# Patient Record
Sex: FEMALE | Race: WHITE | Employment: UNEMPLOYED
[De-identification: names, ages, dates, MRNs, and addresses within clinical notes are randomized per-mention and may not be internally consistent; named-entity substitution may affect disease eponyms.]

---

## 2017-07-08 ENCOUNTER — HEALTH MAINTENANCE LETTER (OUTPATIENT)
Age: 52
End: 2017-07-08

## 2018-04-14 ENCOUNTER — HOSPITAL ENCOUNTER (EMERGENCY)
Facility: CLINIC | Age: 53
Discharge: HOME | End: 2018-04-14
Attending: NURSE PRACTITIONER | Admitting: NURSE PRACTITIONER
Payer: COMMERCIAL

## 2018-04-14 VITALS
RESPIRATION RATE: 16 BRPM | OXYGEN SATURATION: 96 % | BODY MASS INDEX: 26.63 KG/M2 | HEART RATE: 91 BPM | SYSTOLIC BLOOD PRESSURE: 125 MMHG | WEIGHT: 160 LBS | TEMPERATURE: 97.6 F | DIASTOLIC BLOOD PRESSURE: 77 MMHG

## 2018-04-14 DIAGNOSIS — B34.9 VIRAL SYNDROME: ICD-10-CM

## 2018-04-14 PROCEDURE — 99203 OFFICE O/P NEW LOW 30 MIN: CPT | Mod: Z6 | Performed by: NURSE PRACTITIONER

## 2018-04-14 PROCEDURE — G0463 HOSPITAL OUTPT CLINIC VISIT: HCPCS | Performed by: NURSE PRACTITIONER

## 2018-04-14 ASSESSMENT — ENCOUNTER SYMPTOMS
EYES NEGATIVE: 1
SORE THROAT: 0
MUSCULOSKELETAL NEGATIVE: 1
CHILLS: 1
ALLERGIC/IMMUNOLOGIC NEGATIVE: 1
SHORTNESS OF BREATH: 0
ACTIVITY CHANGE: 0
HEMATOLOGIC/LYMPHATIC NEGATIVE: 1
SINUS PRESSURE: 0
ENDOCRINE NEGATIVE: 1
CARDIOVASCULAR NEGATIVE: 1
PSYCHIATRIC NEGATIVE: 1
NEUROLOGICAL NEGATIVE: 1
GASTROINTESTINAL NEGATIVE: 1
FEVER: 1
COUGH: 0
WHEEZING: 0

## 2018-04-14 NOTE — ED PROVIDER NOTES
History     Chief Complaint   Patient presents with     Fever     2 days of fever, off and on no other sx     The history is provided by the patient.     Christine Verdugo is a 53 year old female who presents to the  with fevers off and on since Wednesday at night and cold during the day and no other symptoms. Christine states she has treated her fevers with Advil.  Christine states she has had sinus infections in the past and this does not feel like it.     Problem List:    Patient Active Problem List    Diagnosis Date Noted     Major depression in complete remission (H) 07/25/2012     Priority: Medium     CARDIOVASCULAR SCREENING; LDL GOAL LESS THAN 160 10/31/2010     Priority: Medium     Anxiety 07/13/2009     Priority: Medium        Past Medical History:    No past medical history on file.    Past Surgical History:    No past surgical history on file.    Family History:    Family History   Problem Relation Age of Onset     DIABETES Father      Alcohol/Drug Father      Breast Cancer Maternal Grandmother      Breast Cancer Sister      Breast Cancer Maternal Aunt        Social History:  Marital Status:   [2]  Social History   Substance Use Topics     Smoking status: Former Smoker     Packs/day: 0.30     Types: Cigarettes     Quit date: 3/1/2009     Smokeless tobacco: Never Used     Alcohol use Yes        Medications:      fluticasone (FLONASE) 50 MCG/ACT nasal spray   NO ACTIVE MEDICATIONS         Review of Systems   Constitutional: Positive for chills and fever. Negative for activity change.   HENT: Negative for congestion, ear pain, sinus pressure and sore throat.    Eyes: Negative.    Respiratory: Negative for cough, shortness of breath and wheezing.    Cardiovascular: Negative.    Gastrointestinal: Negative.    Endocrine: Negative.    Genitourinary: Negative.    Musculoskeletal: Negative.    Skin: Negative.    Allergic/Immunologic: Negative.    Neurological: Negative.    Hematological: Negative.     Psychiatric/Behavioral: Negative.        Physical Exam   BP: 137/73  Pulse: 91  Temp: 97.6  F (36.4  C)  Resp: 16  Weight: 72.6 kg (160 lb)  SpO2: 96 %      Physical Exam   Constitutional: She is oriented to person, place, and time.   HENT:   Right Ear: Hearing, tympanic membrane, external ear and ear canal normal.   Left Ear: Hearing, tympanic membrane, external ear and ear canal normal.   Nose: Right sinus exhibits no maxillary sinus tenderness and no frontal sinus tenderness. Left sinus exhibits no maxillary sinus tenderness and no frontal sinus tenderness.   Mouth/Throat: Uvula is midline, oropharynx is clear and moist and mucous membranes are normal. No oral lesions. Dental caries present. No dental abscesses.   Cardiovascular: Normal rate and normal heart sounds.    Pulmonary/Chest: Effort normal and breath sounds normal.   Neurological: She is alert and oriented to person, place, and time.   Skin: Skin is warm and dry. No rash noted.   Psychiatric: She has a normal mood and affect.   Nursing note and vitals reviewed.      ED Course     ED Course     Procedures               Critical Care time:  none               No results found for this or any previous visit (from the past 24 hour(s)).    Medications - No data to display    Assessments & Plan (with Medical Decision Making)     I have reviewed the nursing notes.    I have reviewed the findings, diagnosis, plan and need for follow up with the patient.   Discussed physical exam. Offered to check for influenza or check a CBC. Christine declined at this time. Encouraged symptomatic treatment for now. If fevers are uncontrolled, develops SOB, or any new symptoms she should return to the UC/ER. If fevers continue next week she should follow up with her PCP.  Christine agrees with plan.     New Prescriptions    No medications on file       Final diagnoses:   Viral syndrome       4/14/2018   [facility] EMERGENCY DEPARTMENT     Meggan Jacobo, ROSARIO  CNP  04/14/18 133

## 2018-04-14 NOTE — ED AVS SNAPSHOT
[facility] Emergency Department    [address] [address]    Phone:  [phone]    Fax:  [phone]                                       Christine Verdugo   MRN: [id number]    Department:  [facility] Emergency Department   Date of Visit:  4/14/2018           After Visit Summary Signature Page     I have received my discharge instructions, and my questions have been answered. I have discussed any challenges I see with this plan with the nurse or doctor.    ..........................................................................................................................................  Patient/Patient Representative Signature      ..........................................................................................................................................  Patient Representative Print Name and Relationship to Patient    ..................................................               ................................................  Date                                            Time    ..........................................................................................................................................  Reviewed by Signature/Title    ...................................................              ..............................................  Date                                                            Time

## 2018-04-14 NOTE — ED AVS SNAPSHOT
" [facility] [place] Emergency Department    [address] [address]    Phone:  [phone]    Fax:  [phone]                                       Christine Verdugo   MRN: [id number]    Department:  [facility] [place] Emergency Department   Date of Visit:  4/14/2018           Patient Information     Date Of Birth          1965        Your diagnoses for this visit were:     Viral syndrome        You were seen by Meggan Jacobo APRN CNP.      Follow-up Information     Follow up with JAYLENE Cline MD.    Specialty:  Family Practice    Why:  If symptoms worsen, As needed    Contact information:    [address] [address] [facility] [address]  [phone]          Follow up with [facility] [place] Emergency Department.    Specialty:  EMERGENCY MEDICINE    Why:  If symptoms worsen    Contact information:    [address] [place] [id number] [phone]    Additional information:    The medical center is located at   [address] [place] [address] (between [facility] and   [address] in [place], four miles north   of [place]).        Discharge Instructions         Viral Syndrome (Adult)  A viral illness may cause a number of symptoms. The symptoms depend on the part of the body that the virus affects. If it settles in your nose, throat, and lungs, it may cause cough, sore throat, congestion, and sometimes headache. If it settles in your stomach and intestinal tract, it may cause vomiting and diarrhea. Sometimes it causes vague symptoms like \"aching all over,\" feeling tired, loss of appetite, or fever.  A viral illness usually lasts 1 to 2 weeks, but sometimes it lasts longer. In some cases, a more serious infection can look like a viral syndrome in the first few days of the illness. You may need another exam and additional tests to know the difference. Watch for the warning signs listed below.  Home care  Follow these guidelines for taking care of yourself at home:    If symptoms are " severe, rest at home for the first 2 to 3 days.    Stay away from cigarette smoke - both your smoke and the smoke from others.    You may use over-the-counter acetaminophen or ibuprofen for fever, muscle aching, and headache, unless another medicine was prescribed for this. If you have chronic liver or kidney disease or ever had a stomach ulcer or GI bleeding, talk with your doctor before using these medicines. No one who is younger than 18 and ill with a fever should take aspirin. It may cause severe disease or death.    Your appetite may be poor, so a light diet is fine. Avoid dehydration by drinking 8 to 12 8-ounce glasses of fluids each day. This may include water; orange juice; lemonade; apple, grape, and cranberry juice; clear fruit drinks; electrolyte replacement and sports drinks; and decaffeinated teas and coffee. If you have been diagnosed with a kidney disease, ask your doctor how much and what types of fluids you should drink to prevent dehydration. If you have kidney disease, drinking too much fluid can cause it build up in the your body and be dangerous to your health.    Over-the-counter remedies won't shorten the length of the illness but may be helpful for cough, sore throat; and nasal and sinus congestion. Don't use decongestants if you have high blood pressure.  Follow-up care  Follow up with your healthcare provider if you do not improve over the next week.  Call 911  Get emergency medical care if any of the following occur:    Convulsion    Feeling weak, dizzy, or like you are going to faint    Chest pain, shortness of breath, wheezing, or difficulty breathing  When to seek medical advice  Call your healthcare provider right away if any of these occur:    Cough with lots of colored sputum (mucus) or blood in your sputum    Chest pain, shortness of breath, wheezing, or difficulty breathing    Severe headache; face, neck, or ear pain    Severe, constant pain in the lower right side of your belly  (abdominal)    Continued vomiting (can t keep liquids down)    Frequent diarrhea (more than 5 times a day); blood (red or black color) or mucus in diarrhea    Feeling weak, dizzy, or like you are going to faint    Extreme thirst    Fever of 100.4 F (38 C) or higher, or as directed by your healthcare provider  Date Last Reviewed: 9/25/2015 2000-2017 [employer]. [address] [place] [address], [place] [address]. All rights reserved. This information is not intended as a substitute for professional medical care. Always follow your healthcare professional's instructions.          24 Hour Appointment Hotline       To make an appointment at any [facility], call [phone] ([phone]). If you don't have a family doctor or clinic, we will help you find one. [facility] clinics are conveniently located to serve the needs of you and your family.             Review of your medicines      Our records show that you are taking the medicines listed below. If these are incorrect, please call your family doctor or clinic.        Dose / Directions Last dose taken    fluticasone 50 MCG/ACT spray   Commonly known as:  FLONASE   Dose:  1-2 spray   Quantity:  1 Package        Spray 1-2 sprays into both nostrils daily.   Refills:  11        NO ACTIVE MEDICATIONS        .   Refills:  0                Orders Needing Specimen Collection     None      Pending Results     No orders found from 4/12/2018 to 4/15/2018.            Pending Culture Results     No orders found from 4/12/2018 to 4/15/2018.            Pending Results Instructions     If you had any lab results that were not finalized at the time of your Discharge, you can call the ED Lab Result RN at [phone]. You will be contacted by this team for any positive Lab results or changes in treatment. The nurses are available 7 days a week from 10A to 6:30P.  You can leave a message 24 hours per day and they will return your call.        Test Results From Your  "Hospital Stay               Thank you for choosing [facility]       Thank you for choosing [facility] for your care. Our goal is always to provide you with excellent care. Hearing back from our patients is one way we can continue to improve our services. Please take a few minutes to complete the written survey that you may receive in the mail after you visit with us. Thank you!        [employer] Information     [employer] lets you send messages to your doctor, view your test results, renew your prescriptions, schedule appointments and more. To sign up, go to [email or web address] . Click on \"Log in\" on the left side of the screen, which will take you to the Welcome page. Then click on \"Sign up Now\" on the right side of the page.     You will be asked to enter the access code listed below, as well as some personal information. Please follow the directions to create your username and password.     Your access code is: [email or web address]  Expires: 2018  1:32 PM     Your access code will  in 90 days. If you need help or a new code, please call your [facility] clinic or [phone].        Care EveryWhere ID     This is your Care EveryWhere ID. This could be used by other organizations to access your [facility] medical records  [id number]        Equal Access to Services     CYNDY ROGERS : Hadcarmella putnamo Sojey, waaxda luqadaha, qaybta kaalmada adeegyada, evelio rider. So [facility] [place] [phone].    ATENCIÓN: Si habla español, tiene a ryan disposición servicios gratuitos de asistencia lingüística. Llame al [phone].    We comply with applicable federal civil rights laws and [place] laws. We do not discriminate on the basis of race, color, national origin, age, disability, sex, sexual orientation, or gender identity.            After Visit Summary       This is your record. Keep this with you and show to your community pharmacist(s) and doctor(s) at your next visit.                  "

## 2018-04-14 NOTE — DISCHARGE INSTRUCTIONS

## 2021-05-04 ENCOUNTER — HOSPITAL ENCOUNTER (EMERGENCY)
Facility: CLINIC | Age: 56
Discharge: LEFT BEFORE DISCHARGE | End: 2021-05-04

## 2021-05-04 VITALS
OXYGEN SATURATION: 100 % | HEART RATE: 105 BPM | DIASTOLIC BLOOD PRESSURE: 79 MMHG | TEMPERATURE: 97.8 F | SYSTOLIC BLOOD PRESSURE: 141 MMHG | RESPIRATION RATE: 18 BRPM

## 2021-05-04 NOTE — ED NOTES
Pt requesting UC at this time.   Advised pt that possibility of moving to ER to be seen depending on provider.    UC provider feels like pt should be emergency care.   Pt in tears due to not having insurance at this time.

## 2024-10-17 SDOH — HEALTH STABILITY: PHYSICAL HEALTH: ON AVERAGE, HOW MANY MINUTES DO YOU ENGAGE IN EXERCISE AT THIS LEVEL?: 30 MIN

## 2024-10-17 SDOH — HEALTH STABILITY: PHYSICAL HEALTH: ON AVERAGE, HOW MANY DAYS PER WEEK DO YOU ENGAGE IN MODERATE TO STRENUOUS EXERCISE (LIKE A BRISK WALK)?: 7 DAYS

## 2024-10-17 ASSESSMENT — SOCIAL DETERMINANTS OF HEALTH (SDOH): HOW OFTEN DO YOU GET TOGETHER WITH FRIENDS OR RELATIVES?: NEVER

## 2024-10-18 ENCOUNTER — OFFICE VISIT (OUTPATIENT)
Dept: FAMILY MEDICINE | Facility: CLINIC | Age: 59
End: 2024-10-18
Payer: COMMERCIAL

## 2024-10-18 ENCOUNTER — PATIENT OUTREACH (OUTPATIENT)
Dept: ONCOLOGY | Facility: CLINIC | Age: 59
End: 2024-10-18

## 2024-10-18 ENCOUNTER — ORDERS ONLY (AUTO-RELEASED) (OUTPATIENT)
Dept: FAMILY MEDICINE | Facility: CLINIC | Age: 59
End: 2024-10-18

## 2024-10-18 VITALS
SYSTOLIC BLOOD PRESSURE: 130 MMHG | HEIGHT: 65 IN | OXYGEN SATURATION: 99 % | RESPIRATION RATE: 18 BRPM | HEART RATE: 87 BPM | WEIGHT: 113 LBS | DIASTOLIC BLOOD PRESSURE: 74 MMHG | BODY MASS INDEX: 18.83 KG/M2 | TEMPERATURE: 98.2 F

## 2024-10-18 DIAGNOSIS — Z12.11 SCREEN FOR COLON CANCER: ICD-10-CM

## 2024-10-18 DIAGNOSIS — Z63.6 CAREGIVER STRESS: ICD-10-CM

## 2024-10-18 DIAGNOSIS — R63.4 WEIGHT LOSS: ICD-10-CM

## 2024-10-18 DIAGNOSIS — H90.5 SENSORINEURAL HEARING LOSS (SNHL) OF LEFT EAR, UNSPECIFIED HEARING STATUS ON CONTRALATERAL SIDE: ICD-10-CM

## 2024-10-18 DIAGNOSIS — F41.9 ANXIETY: ICD-10-CM

## 2024-10-18 DIAGNOSIS — Z00.00 ROUTINE GENERAL MEDICAL EXAMINATION AT A HEALTH CARE FACILITY: Primary | ICD-10-CM

## 2024-10-18 DIAGNOSIS — Z13.220 LIPID SCREENING: ICD-10-CM

## 2024-10-18 DIAGNOSIS — Z11.4 SCREENING FOR HIV (HUMAN IMMUNODEFICIENCY VIRUS): ICD-10-CM

## 2024-10-18 DIAGNOSIS — Z80.3 FAMILY HISTORY OF MALIGNANT NEOPLASM OF BREAST: ICD-10-CM

## 2024-10-18 DIAGNOSIS — Z12.31 VISIT FOR SCREENING MAMMOGRAM: ICD-10-CM

## 2024-10-18 DIAGNOSIS — Z11.59 NEED FOR HEPATITIS C SCREENING TEST: ICD-10-CM

## 2024-10-18 LAB
ALBUMIN SERPL BCG-MCNC: 4.4 G/DL (ref 3.5–5.2)
ALP SERPL-CCNC: 76 U/L (ref 40–150)
ALT SERPL W P-5'-P-CCNC: 16 U/L (ref 0–50)
ANION GAP SERPL CALCULATED.3IONS-SCNC: 7 MMOL/L (ref 7–15)
AST SERPL W P-5'-P-CCNC: 19 U/L (ref 0–45)
BASOPHILS # BLD AUTO: 0 10E3/UL (ref 0–0.2)
BASOPHILS NFR BLD AUTO: 0 %
BILIRUB SERPL-MCNC: 1.1 MG/DL
BUN SERPL-MCNC: 9.5 MG/DL (ref 8–23)
CALCIUM SERPL-MCNC: 9.1 MG/DL (ref 8.8–10.4)
CHLORIDE SERPL-SCNC: 108 MMOL/L (ref 98–107)
CHOLEST SERPL-MCNC: 176 MG/DL
CREAT SERPL-MCNC: 0.74 MG/DL (ref 0.51–0.95)
CRP SERPL-MCNC: <3 MG/L
EGFRCR SERPLBLD CKD-EPI 2021: >90 ML/MIN/1.73M2
EOSINOPHIL # BLD AUTO: 0.1 10E3/UL (ref 0–0.7)
EOSINOPHIL NFR BLD AUTO: 2 %
ERYTHROCYTE [DISTWIDTH] IN BLOOD BY AUTOMATED COUNT: 13 % (ref 10–15)
ERYTHROCYTE [SEDIMENTATION RATE] IN BLOOD BY WESTERGREN METHOD: 7 MM/HR (ref 0–30)
EST. AVERAGE GLUCOSE BLD GHB EST-MCNC: 100 MG/DL
FASTING STATUS PATIENT QL REPORTED: NO
FASTING STATUS PATIENT QL REPORTED: NO
GLUCOSE SERPL-MCNC: 85 MG/DL (ref 70–99)
HBA1C MFR BLD: 5.1 % (ref 0–5.6)
HCO3 SERPL-SCNC: 27 MMOL/L (ref 22–29)
HCT VFR BLD AUTO: 39.4 % (ref 35–47)
HCV AB SERPL QL IA: NONREACTIVE
HDLC SERPL-MCNC: 74 MG/DL
HGB BLD-MCNC: 13 G/DL (ref 11.7–15.7)
HIV 1+2 AB+HIV1 P24 AG SERPL QL IA: NONREACTIVE
IMM GRANULOCYTES # BLD: 0 10E3/UL
IMM GRANULOCYTES NFR BLD: 0 %
LDLC SERPL CALC-MCNC: 79 MG/DL
LYMPHOCYTES # BLD AUTO: 1.5 10E3/UL (ref 0.8–5.3)
LYMPHOCYTES NFR BLD AUTO: 31 %
MCH RBC QN AUTO: 31 PG (ref 26.5–33)
MCHC RBC AUTO-ENTMCNC: 33 G/DL (ref 31.5–36.5)
MCV RBC AUTO: 94 FL (ref 78–100)
MONOCYTES # BLD AUTO: 0.4 10E3/UL (ref 0–1.3)
MONOCYTES NFR BLD AUTO: 8 %
NEUTROPHILS # BLD AUTO: 2.8 10E3/UL (ref 1.6–8.3)
NEUTROPHILS NFR BLD AUTO: 60 %
NONHDLC SERPL-MCNC: 102 MG/DL
PLATELET # BLD AUTO: 189 10E3/UL (ref 150–450)
POTASSIUM SERPL-SCNC: 3.9 MMOL/L (ref 3.4–5.3)
PROT SERPL-MCNC: 6.8 G/DL (ref 6.4–8.3)
RBC # BLD AUTO: 4.2 10E6/UL (ref 3.8–5.2)
SODIUM SERPL-SCNC: 142 MMOL/L (ref 135–145)
TRIGL SERPL-MCNC: 115 MG/DL
TSH SERPL DL<=0.005 MIU/L-ACNC: 2.02 UIU/ML (ref 0.3–4.2)
WBC # BLD AUTO: 4.7 10E3/UL (ref 4–11)

## 2024-10-18 PROCEDURE — 80061 LIPID PANEL: CPT | Performed by: FAMILY MEDICINE

## 2024-10-18 PROCEDURE — 86803 HEPATITIS C AB TEST: CPT | Performed by: FAMILY MEDICINE

## 2024-10-18 PROCEDURE — 86140 C-REACTIVE PROTEIN: CPT | Performed by: FAMILY MEDICINE

## 2024-10-18 PROCEDURE — 99204 OFFICE O/P NEW MOD 45 MIN: CPT | Mod: 25 | Performed by: FAMILY MEDICINE

## 2024-10-18 PROCEDURE — 36415 COLL VENOUS BLD VENIPUNCTURE: CPT | Performed by: FAMILY MEDICINE

## 2024-10-18 PROCEDURE — 80053 COMPREHEN METABOLIC PANEL: CPT | Performed by: FAMILY MEDICINE

## 2024-10-18 PROCEDURE — 85025 COMPLETE CBC W/AUTO DIFF WBC: CPT | Performed by: FAMILY MEDICINE

## 2024-10-18 PROCEDURE — 84443 ASSAY THYROID STIM HORMONE: CPT | Performed by: FAMILY MEDICINE

## 2024-10-18 PROCEDURE — 85652 RBC SED RATE AUTOMATED: CPT | Performed by: FAMILY MEDICINE

## 2024-10-18 PROCEDURE — 87389 HIV-1 AG W/HIV-1&-2 AB AG IA: CPT | Performed by: FAMILY MEDICINE

## 2024-10-18 PROCEDURE — 99396 PREV VISIT EST AGE 40-64: CPT | Performed by: FAMILY MEDICINE

## 2024-10-18 PROCEDURE — 83036 HEMOGLOBIN GLYCOSYLATED A1C: CPT | Performed by: FAMILY MEDICINE

## 2024-10-18 RX ORDER — HYDROXYZINE HYDROCHLORIDE 25 MG/1
25 TABLET, FILM COATED ORAL 3 TIMES DAILY PRN
Qty: 30 TABLET | Refills: 1 | Status: SHIPPED | OUTPATIENT
Start: 2024-10-18

## 2024-10-18 RX ORDER — OMEPRAZOLE 20 MG/1
20 TABLET, DELAYED RELEASE ORAL DAILY
COMMUNITY

## 2024-10-18 SDOH — SOCIAL STABILITY - SOCIAL INSECURITY: DEPENDENT RELATIVE NEEDING CARE AT HOME: Z63.6

## 2024-10-18 ASSESSMENT — PAIN SCALES - GENERAL: PAINLEVEL: NO PAIN (0)

## 2024-10-18 NOTE — PROGRESS NOTES
New Patient Oncology Nurse Navigator Note     Referring provider: Inga Dumas DOCl [facility]     Referred to (specialty):  Genetic Counseling    Date Referral Received: 10/18/2024     Evaluation for: 59 year old female, family hx of sister who diagnosed with breast cancer in early 30s and passed

## 2024-10-18 NOTE — PROGRESS NOTES
Preventive Care Visit  [facility]  MATILDA RODAS DO, Family Medicine  Oct 18, 2024      Assessment & Plan     Routine general medical examination at a health care facility    Visit for screening mammogram  - MA Screening Bilateral w/ Smith    Screen for colon cancer  - COLOGUARD(EXACT SCIENCES)    Screening for HIV (human immunodeficiency virus)  - HIV Antigen Antibody Combo  - HIV Antigen Antibody Combo    Need for hepatitis C screening test  - Hepatitis C Screen Reflex to HCV RNA Quant and Genotype  - Hepatitis C Screen Reflex to HCV RNA Quant and Genotype    Lipid screening  - Lipid panel reflex to direct LDL Non-fasting  - Lipid panel reflex to direct LDL Non-fasting    Anxiety  Ongoing anxiety, difficult living situation currently.  Not interested in daily medication but would be interested in trying as needed med for sleep.  Recommending establishing with counselor she is also looking outside of [place]  - Adult Mental [facility] Referral  - hydrOXYzine HCl (ATARAX) 25 MG tablet  Dispense: 30 tablet; Refill: 1    Caregiver stress  Helps to care for her mother-in-law  - Adult Mental Health  Referral    Weight loss  Patient used to be heavy alcohol use, quit about 2 years ago and is lost significant amount of weight.  Sometimes has hard time maintaining weight.  Plan to do labs as below to rule out any metabolic causes.  I would like to see her in close follow-up in 4 to 6 weeks.  I did offer considering doing upper and lower endoscopy however she would defer at this time  - Comprehensive metabolic panel (BMP + Alb, Alk Phos, ALT, AST, Total. Bili, TP)  - Hemoglobin A1c  - CBC with platelets and differential  - TSH with free T4 reflex  - ESR: Erythrocyte sedimentation rate  - CRP, inflammation  - Comprehensive metabolic panel (BMP + Alb, Alk Phos, ALT, AST, Total. Bili, TP)  - Hemoglobin A1c  - CBC with platelets and differential  - TSH with free T4 reflex  - ESR:  Erythrocyte sedimentation rate  - CRP, inflammation    Sensorineural hearing loss (SNHL) of left ear, unspecified hearing status on contralateral side  Left ear with profound hearing loss,), no cause on exam today appreciated.  Recommend meeting with ENT  - Adult ENT  Referral    Family history of malignant neoplasm of breast  Sister was diagnosed with breast cancer in her early 30s and passed away shortly after this.  Has not kept up with mammograms.  Recommend meeting with genetics to discuss any genetic screening  - Adult Oncology/Hematology  Referral        Counseling  Appropriate preventive services were addressed with this patient via screening, questionnaire, or discussion as appropriate for fall prevention, nutrition, physical activity, Tobacco-use cessation, social engagement, weight loss and cognition.  Checklist reviewing preventive services available has been given to the patient.  Reviewed patient's diet, addressing concerns and/or questions.   Patient is at risk for social isolation and has been provided with information about the benefit of social connection.   The patient was instructed to see the dentist every 6 months.   She is at risk for psychosocial distress and has been provided with information to reduce risk.       Zeenat Georges is a 59 year old, presenting for the following:  Physical        10/18/2024     6:54 AM   Additional Questions   Roomed by Ivanna Ledezma CMA   Accompanied by         Health Care Directive  Patient does not have a Health Care Directive or Living Will: Discussed advance care planning with patient; information given to patient to review.    HPI    Unable to hear only on her left side-unsure for how long   6 months ago 129 lbs- 15 lbs down from last 6 months         10/17/2024   General Health   How would you rate your overall physical health? Excellent   Feel stress (tense, anxious, or unable to sleep) Only a little      (!) STRESS  CONCERN      10/17/2024   Nutrition   Three or more servings of calcium each day? (!) I DON'T KNOW   Diet: Regular (no restrictions)   How many servings of fruit and vegetables per day? 4 or more   How many sweetened beverages each day? 0-1            10/17/2024   Exercise   Days per week of moderate/strenous exercise 7 days   Average minutes spent exercising at this level 30 min            10/17/2024   Social Factors   Frequency of gathering with friends or relatives Never   Worry food won't last until get money to buy more No   Food not last or not have enough money for food? No   Do you have housing? (Housing is defined as stable permanent housing and does not include staying ouside in a car, in a tent, in an abandoned building, in an overnight shelter, or couch-surfing.) Yes   Are you worried about losing your housing? No   Lack of transportation? No   Unable to get utilities (heat,electricity)? No      (!) SOCIAL CONNECTIONS CONCERN      10/17/2024   Fall Risk   Fallen 2 or more times in the past year? No   Trouble with walking or balance? No             10/17/2024   Dental   Dentist two times every year? (!) NO            10/17/2024   TB Screening   Were you born outside of the [place]? No            Today's PHQ-2 Score:       10/17/2024     7:23 PM   PHQ-2 ( 1999 [employer])   Q1: Little interest or pleasure in doing things 1   Q2: Feeling down, depressed or hopeless 1   PHQ-2 Score 2   Q1: Little interest or pleasure in doing things Several days   Q2: Feeling down, depressed or hopeless Several days   PHQ-2 Score 2           10/17/2024   Substance Use   Alcohol more than 3/day or more than 7/wk Not Applicable   Do you use any other substances recreationally? (!) OTHER        Social History     Tobacco Use    Smoking status: Former     Current packs/day: 0.00     Types: Cigarettes     Quit date: 3/1/2009     Years since quitting: 15.6    Smokeless tobacco: Never   Substance Use Topics    Alcohol use: Not Currently     "Drug use: No        Mammogram Screening - Mammogram every 1-2 years updated in Health Maintenance based on mutual decision making          10/17/2024   One time HIV Screening   Previous HIV test? I don't know          10/17/2024   STI Screening   New sexual partner(s) since last STI/HIV test? No        History of abnormal Pap smear: Status post hysterectomy with removal of cervix and no history of CIN2 or greater or cervical cancer. Health Maintenance and Surgical History updated.       ASCVD Risk   The 10-year ASCVD risk score (Hong LOMELI, et al., 2019) is: 2.2%    Values used to calculate the score:      Age: 59 years      Sex: Female      Is Non- : No      Diabetic: No      Tobacco smoker: No      Systolic Blood Pressure: 130 mmHg      Is BP treated: No      HDL Cholesterol: 74 mg/dL      Total Cholesterol: 176 mg/dL          Reviewed and updated as needed this visit by Provider   Tobacco      Surg Hx             No past medical history on file.  Past Surgical History:   Procedure Laterality Date    HYSTERECTOMY           Review of Systems  Constitutional, HEENT, cardiovascular, pulmonary, gi and gu systems are negative, except as otherwise noted.     Objective    Exam  /74   Pulse 87   Temp 98.2  F (36.8  C) (Tympanic)   Resp 18   Ht 1.651 m (5' 5\")   Wt 51.3 kg (113 lb)   LMP  (LMP Unknown)   SpO2 99%   BMI 18.80 kg/m     Estimated body mass index is 18.8 kg/m  as calculated from the following:    Height as of this encounter: 1.651 m (5' 5\").    Weight as of this encounter: 51.3 kg (113 lb).    Physical Exam  Constitutional:       Appearance: Normal appearance.   HENT:      Head: Normocephalic.      Right Ear: Tympanic membrane normal.      Left Ear: Tympanic membrane normal.      Nose: Nose normal.   Eyes:      Conjunctiva/sclera: Conjunctivae normal.   Cardiovascular:      Rate and Rhythm: Normal rate and regular rhythm.   Pulmonary:      Effort: Pulmonary " effort is normal.      Breath sounds: Normal breath sounds.   Abdominal:      General: Bowel sounds are normal.   Musculoskeletal:      Right lower leg: No edema.      Left lower leg: No edema.   Skin:     General: Skin is warm and dry.   Neurological:      Mental Status: She is alert.   Psychiatric:         Thought Content: Thought content normal.         Judgment: Judgment normal.         Signed Electronically by: MATILDA RODAS DO

## 2024-10-18 NOTE — PATIENT INSTRUCTIONS
[employer] [place] Website:   [email or web address].[email or web address]       Psychology Today Website - : https://www.psychologytoday.com/us/therapists/      Crisis Resources       [facility] CRISIS LINE: [phone]       If you are in crisis, please take good care of yourself and consider accessing the resources available to you:       Steps to care for yourself       1. If you are currently in therapy, call your therapist for an appointment   2. Call the local crisis resources below if needed.   3. Contact friends or family for support.   4. Get more exercise.   5. Do activities you enjoy.   6. Eat a well-balanced diet and drink plenty of fluids.   7. Rest as needed. Get good sleep.   8. Limit or discontinue alcohol and recreational drugs.       When to contact your primary care provider         You have thoughts of harming or killing yourself but have not made a plan to carry it out.     Your depression gets in the way of daily activities.     You are unable to sleep.     You need help cutting back on alcohol or recreational drugs.       When to call 911 or go to the Emergency Room       Get emergency help right away if you have any of the following:     You are planning to harm or kill yourself and you have a way to carry out the plan.     You have injured yourself or others. Or, you think you will.     You feel confused or are having trouble thinking or remembering.     You are having delusions (false beliefs).     You are hearing voices or seeing things that aren't there.     You are feeling psychotic (paranoid, fearful, restless, agitated, nervous, racing thoughts or speech)       Crisis Resources       The telephone number for the [facility] ([facility]), [facility] is [phone]       These hotlines are for both adults and children. The and are open 24 hours a day, 7 days a week unless noted otherwise.       National Suicide Prevention  Lifeline   5-741-797-TALK (4989)     In case of life-threatening emergency, call 911 or go to the emergency department.     Call the National Suicide Prevention Lifeline 2-168-400-TALK (7454) to be connected to a counselor at a crisis center in your area if you, a family member, or friend are experiencing thoughts of suicide. The call is FREE, confidential, and always available.     Crisis Text Line: Text HOME to 401263 to connect with a Crisis Counselor. Free 24/7 support.       Crisis Text Line                        www.crisistextline.org   Text HOME to 521139 from anywhere in the [place], anytime, about any type of crisis. A live, trained crisis counselor will receive the text and respond quickly.     For [facility] crisis numbers, visit the [facility] website at:   https://[place].[facility]/dhs/people-we-serve/adults/health-care/mental-health/resources/crisis-contacts.jsp       Other Crisis Information:     [facility] Helpline: The toll free number is (386) [phone]. The [facility] Helpline connects callers to financial help, mental health counselors, legal assistance, and more.       [place] Crisis Teams: Crisis teams, made up of mental health professionals, can travel to an individual's location (home, school, or other public areas) and assess the situation. They provide stabilization services, intervention services, crisis prevention planning, referral to other professionals, and follow-up services.The crisis teams are available by phone 24 hours a day, seven days a week. Call the team in your area:     [facility]: [phone]     [facility]: [phone]     [place]: [phone]     [place]: [phone]     [place]: adults - [phone], children - [phone]     [facility]: [phone]     [place]: adults - [phone], children - [phone]     General Mental Health Resources:     National [place] [facility] Mental [facility]ness of [place] ([place]) provides  support groups and educational programs. Visit www.namihelps.org or call the [facility] Helpline at 1-312.298.1148 or 731-393-0299 for further information.     Substance Abuse and Mental Health Services Administration - visit www.samhsa.gov     Patient Education   Preventive Care Advice   This is general advice given by our system to help you stay healthy. However, your care team may have specific advice just for you. Please talk to your care team about your preventive care needs.  Nutrition  Eat 5 or more servings of fruits and vegetables each day.  Try wheat bread, brown rice and whole grain pasta (instead of white bread, rice, and pasta).  Get enough calcium and vitamin D. Check the label on foods and aim for 100% of the RDA (recommended daily allowance).  Lifestyle  Exercise at least 150 minutes each week  (30 minutes a day, 5 days a week).  Do muscle strengthening activities 2 days a week. These help control your weight and prevent disease.  No smoking.  Wear sunscreen to prevent skin cancer.  Have a dental exam and cleaning every 6 months.  Yearly exams  See your health care team every year to talk about:  Any changes in your health.  Any medicines your care team has prescribed.  Preventive care, family planning, and ways to prevent chronic diseases.  Shots (vaccines)   HPV shots (up to age 26), if you've never had them before.  Hepatitis B shots (up to age 59), if you've never had them before.  COVID-19 shot: Get this shot when it's due.  Flu shot: Get a flu shot every year.  Tetanus shot: Get a tetanus shot every 10 years.  Pneumococcal, hepatitis A, and RSV shots: Ask your care team if you need these based on your risk.  Shingles shot (for age 50 and up)  General health tests  Diabetes screening:  Starting at age 35, Get screened for diabetes at least every 3 years.  If you are younger than age 35, ask your care team if you should be screened for diabetes.  Cholesterol test: At age 39, start having a cholesterol  test every 5 years, or more often if advised.  Bone density scan (DEXA): At age 50, ask your care team if you should have this scan for osteoporosis (brittle bones).  Hepatitis C: Get tested at least once in your life.  STIs (sexually transmitted infections)  Before age 24: Ask your care team if you should be screened for STIs.  After age 24: Get screened for STIs if you're at risk. You are at risk for STIs (including HIV) if:  You are sexually active with more than one person.  You don't use condoms every time.  You or a partner was diagnosed with a sexually transmitted infection.  If you are at risk for HIV, ask about PrEP medicine to prevent HIV.  Get tested for HIV at least once in your life, whether you are at risk for HIV or not.  Cancer screening tests  Cervical cancer screening: If you have a cervix, begin getting regular cervical cancer screening tests starting at age 21.  Breast cancer scan (mammogram): If you've ever had breasts, begin having regular mammograms starting at age 40. This is a scan to check for breast cancer.  Colon cancer screening: It is important to start screening for colon cancer at age 45.  Have a colonoscopy test every 10 years (or more often if you're at risk) Or, ask your provider about stool tests like a FIT test every year or Cologuard test every 3 years.  To learn more about your testing options, visit:   .  For help making a decision, visit:   https://bit.ly/pn12011.  Prostate cancer screening test: If you have a prostate, ask your care team if a prostate cancer screening test (PSA) at age 55 is right for you.  Lung cancer screening: If you are a current or former smoker ages 50 to 80, ask your care team if ongoing lung cancer screenings are right for you.  For informational purposes only. Not to replace the advice of your health care provider. Copyright   2023 [employer]. All rights reserved. Clinically reviewed by the [facility] Transitions Program.  [employer] [id number] - REV 01/24.  Substance Use Disorder: Care Instructions  Overview     You can improve your life and health by stopping your use of alcohol or drugs. When you don't drink or use drugs, you may feel and sleep better. You may get along better with your family, friends, and coworkers. There are medicines and programs that can help with substance use disorder.  How can you care for yourself at home?  Here are some ways to help you stay sober and prevent relapse.  If you have been given medicine to help keep you sober or reduce your cravings, be sure to take it exactly as prescribed.  Talk to your doctor about programs that can help you stop using drugs or drinking alcohol.  Do not keep alcohol or drugs in your home.  Plan ahead. Think about what you'll say if other people ask you to drink or use drugs. Try not to spend time with people who drink or use drugs.  Use the time and money spent on drinking or drugs to do something that's important to you.  Preventing a relapse  Have a plan to deal with relapse. Learn to recognize changes in your thinking that lead you to drink or use drugs. Get help before you start to drink or use drugs again.  Try to stay away from situations, friends, or places that may lead you to drink or use drugs.  If you feel the need to drink alcohol or use drugs again, seek help right away. Call a trusted friend or family member. Some people get support from organizations such as Narcotics Anonymous or [employer] or from treatment facilities.  If you relapse, get help as soon as you can. Some people make a plan with another person that outlines what they want that person to do for them if they relapse. The plan usually includes how to handle the relapse and who to notify in case of relapse.  Don't give up. Remember that a relapse doesn't mean that you have failed. Use the experience to learn the triggers that lead you to drink or use drugs. Then quit again. Recovery is a  "lifelong process. Many people have several relapses before they are able to quit for good.  Follow-up care is a key part of your treatment and safety. Be sure to make and go to all appointments, and call your doctor if you are having problems. It's also a good idea to know your test results and keep a list of the medicines you take.  When should you call for help?   Call 911  anytime you think you may need emergency care. For example, call if you or someone else:    Has overdosed or has withdrawal signs. Be sure to tell the emergency workers that you are or someone else is using or trying to quit using drugs. Overdose or withdrawal signs may include:  Losing consciousness.  Seizure.  Seeing or hearing things that aren't there (hallucinations).     Is thinking or talking about suicide or harming others.   Where to get help 24 hours a day, 7 days a week   If you or someone you know talks about suicide, self-harm, a mental health crisis, a substance use crisis, or any other kind of emotional distress, get help right away. You can:    Call the Suicide and Crisis Lifeline at 988.     Call 4-934-055-URCB (1-621.766.2114).     Text HOME to 131074 to access the Crisis Text Line.   Consider saving these numbers in your phone.  Go to [facility].org for more information or to chat online.  Call your doctor now or seek immediate medical care if:    You are having withdrawal symptoms. These may include nausea or vomiting, sweating, shakiness, and anxiety.   Watch closely for changes in your health, and be sure to contact your doctor if:    You have a relapse.     You need more help or support to stop.   Where can you learn more?  Go to https://www.healthEve.net/patiented  Enter H573 in the search box to learn more about \"Substance Use Disorder: Care Instructions.\"  Current as of: November 15, 2023  Content Version: 14.2 2024 [employer] [place].   Care instructions adapted under license by your healthcare professional. " If you have questions about a medical condition or this instruction, always ask your healthcare professional. [employer] disclaims any warranty or liability for your use of this information.

## 2024-10-24 ENCOUNTER — HOSPITAL ENCOUNTER (OUTPATIENT)
Dept: MAMMOGRAPHY | Facility: CLINIC | Age: 59
Discharge: HOME | End: 2024-10-24
Attending: FAMILY MEDICINE | Admitting: FAMILY MEDICINE
Payer: COMMERCIAL

## 2024-10-24 DIAGNOSIS — Z12.31 VISIT FOR SCREENING MAMMOGRAM: ICD-10-CM

## 2024-10-24 PROCEDURE — 77063 BREAST TOMOSYNTHESIS BI: CPT

## 2024-11-12 SDOH — HEALTH STABILITY: PHYSICAL HEALTH: ON AVERAGE, HOW MANY MINUTES DO YOU ENGAGE IN EXERCISE AT THIS LEVEL?: 30 MIN

## 2024-11-12 SDOH — HEALTH STABILITY: PHYSICAL HEALTH: ON AVERAGE, HOW MANY DAYS PER WEEK DO YOU ENGAGE IN MODERATE TO STRENUOUS EXERCISE (LIKE A BRISK WALK)?: 5 DAYS

## 2024-11-12 ASSESSMENT — SOCIAL DETERMINANTS OF HEALTH (SDOH): HOW OFTEN DO YOU GET TOGETHER WITH FRIENDS OR RELATIVES?: NEVER

## 2024-11-14 ENCOUNTER — OFFICE VISIT (OUTPATIENT)
Dept: AUDIOLOGY | Facility: CLINIC | Age: 59
End: 2024-11-14
Payer: COMMERCIAL

## 2024-11-14 DIAGNOSIS — H90.A32 MIXED CONDUCTIVE AND SENSORINEURAL HEARING LOSS OF LEFT EAR WITH RESTRICTED HEARING OF RIGHT EAR: Primary | ICD-10-CM

## 2024-11-14 DIAGNOSIS — H90.A21 SENSORINEURAL HEARING LOSS (SNHL) OF RIGHT EAR WITH RESTRICTED HEARING OF LEFT EAR: ICD-10-CM

## 2024-11-14 PROCEDURE — 92565 STENGER TEST PURE TONE: CPT | Performed by: AUDIOLOGIST

## 2024-11-14 PROCEDURE — 92557 COMPREHENSIVE HEARING TEST: CPT | Performed by: AUDIOLOGIST

## 2024-11-14 PROCEDURE — 92550 TYMPANOMETRY & REFLEX THRESH: CPT | Performed by: AUDIOLOGIST

## 2024-11-14 ASSESSMENT — PATIENT HEALTH QUESTIONNAIRE - PHQ9
10. IF YOU CHECKED OFF ANY PROBLEMS, HOW DIFFICULT HAVE THESE PROBLEMS MADE IT FOR YOU TO DO YOUR WORK, TAKE CARE OF THINGS AT HOME, OR GET ALONG WITH OTHER PEOPLE: SOMEWHAT DIFFICULT
SUM OF ALL RESPONSES TO PHQ QUESTIONS 1-9: 4
SUM OF ALL RESPONSES TO PHQ QUESTIONS 1-9: 4

## 2024-11-14 NOTE — PROGRESS NOTES
AUDIOLOGY REPORT    SUBJECTIVE:  Christine Verdugo is a 59 year old female who was seen in the [facility]iology [facility] on 11/14/24 for audiologic evaluation, referred by Inga PONCE CNP.  The patient reports a longstanding bilateral hearing loss which is worse in the left ear. 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 The patient denies  bilateral tinnitus, bilateral otalgia, bilateral drainage, bilateral aural fullness, family history of hearing loss, and dizziness. The patient notes difficulty with communication in a variety of listening situations. Patient was unaccompanied to today's visit.     Abuse Screening:  Do you feel unsafe at home or work/school? No  Do you feel threatened by someone? No  Does anyone try to keep you from having contact with others, or doing things outside of your home? No  Physical signs of abuse present? No     Fall Risk Screen:  1. Have you fallen two or more times in the past year? No  2. Have you fallen and had an injury in the past year? No    OBJECTIVE:    Otoscopic exam indicates ears are clear of cerumen bilaterally     Pure Tone Thresholds assessed using standard techniques  audiometry with good  reliability from 250-8000 Hz bilaterally using insert earphones and circumaural headphones     RIGHT:  mild and moderate sensorineural hearing loss    LEFT:    mild and profound mixed hearing loss   NOTE: Change in transducers did not merit a change in thresholds.     Gloria: Negative     Tympanogram:    RIGHT: normal eardrum mobility    LEFT:   normal eardrum mobility    Reflexes (reported by stimulus ear): 1000 Hz  RIGHT: Ipsilateral is absent at frequencies tested  RIGHT: Contralateral is absent at frequencies tested  LEFT:   Ipsilateral is absent at frequencies tested  LEFT:   Contralateral is absent at frequencies tested    Speech Reception Threshold:    RIGHT: 35 dB HL    LEFT:   60 dB HL    Word Recognition Score:      RIGHT: 96% at 80 dB HL using NU-6 recorded word list.    LEFT:   72% at 90 dB HL using NU-6 recorded word list.    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     PLAN:  Patient was counseled regarding hearing loss and impact on communication.  Patient is a good candidate for amplification at this time. It is recommended that the patient keep and attend her appointment with ENT on 11/21/2024.  Please call this clinic with questions regarding these results or recommendations.    Woo Hernandez CCC-A  Licensed Audiologist #[id number]  11/14/2024    CC: Cherise PONCE CNP

## 2024-11-15 ENCOUNTER — OFFICE VISIT (OUTPATIENT)
Dept: FAMILY MEDICINE | Facility: CLINIC | Age: 59
End: 2024-11-15
Payer: COMMERCIAL

## 2024-11-15 VITALS
RESPIRATION RATE: 16 BRPM | HEIGHT: 63 IN | TEMPERATURE: 98.2 F | HEART RATE: 72 BPM | BODY MASS INDEX: 20.13 KG/M2 | SYSTOLIC BLOOD PRESSURE: 132 MMHG | WEIGHT: 113.6 LBS | OXYGEN SATURATION: 100 % | DIASTOLIC BLOOD PRESSURE: 82 MMHG

## 2024-11-15 DIAGNOSIS — R63.4 WEIGHT LOSS: ICD-10-CM

## 2024-11-15 DIAGNOSIS — Z12.4 CERVICAL CANCER SCREENING: Primary | ICD-10-CM

## 2024-11-15 DIAGNOSIS — F41.9 ANXIETY: ICD-10-CM

## 2024-11-15 ASSESSMENT — PAIN SCALES - GENERAL: PAINLEVEL_OUTOF10: NO PAIN (0)

## 2024-11-15 NOTE — PROGRESS NOTES
"  Assessment & Plan     Cervical cancer screening  - HPV and Gynecologic Cytology Panel - Recommended Age 30 - 65 Years    Anxiety  Doing well on as needed hydroxyzine    Weight loss  Stopped drinking alcohol and changed diet drastically 2 years ago, lost considerably weight-she sometimes skips meal due to worry but has tried to stop this-weight has plateau the last 6 months. We will continue to monitor-no B symptoms. If worsening can consider doing CT scan/EGD/colonoscopy       Counseling  Appropriate preventive services were addressed with this patient via screening, questionnaire, or discussion as appropriate for fall prevention, nutrition, physical activity, Tobacco-use cessation, social engagement, weight loss and cognition.  Checklist reviewing preventive services available has been given to the patient.  Reviewed patient's diet, addressing concerns and/or questions.   Patient is at risk for social isolation and has been provided with information about the benefit of social connection.         Zeenat Georges is a 59 year old, presenting for the following health issues:  Results        11/15/2024     7:29 AM   Additional Questions   Roomed by Dilcia ARANDA MA   Accompanied by Self     HPI     Results- Discuss lab results from her visit last month.    2 years ago stopped drinking alcohol, changed diet and more active.   She was having night sweats in early 50s that responded to estroven supplement .She no longer has these.     Using hydroxyzine as needed-only had to use 1 time and was     Using omeprazole OTC every night-controls symptoms.       Review of Systems  Constitutional, HEENT, cardiovascular, pulmonary, gi and gu systems are negative, except as otherwise noted.      Objective    /82 (BP Location: Right arm, Patient Position: Chair, Cuff Size: Adult Regular)   Pulse 72   Temp 98.2  F (36.8  C)   Resp 16   Ht 1.6 m (5' 3\")   Wt 51.5 kg (113 lb 9.6 oz)   LMP  (LMP Unknown)   SpO2 100%   BMI " 20.12 kg/m    Body mass index is 20.12 kg/m .  Physical Exam  Constitutional:       Appearance: Normal appearance.   HENT:      Head: Normocephalic.      Right Ear: Tympanic membrane normal.      Left Ear: Tympanic membrane normal.      Mouth/Throat:      Mouth: Mucous membranes are moist.   Eyes:      Conjunctiva/sclera: Conjunctivae normal.   Cardiovascular:      Rate and Rhythm: Normal rate and regular rhythm.      Pulses: Normal pulses.   Pulmonary:      Effort: Pulmonary effort is normal.      Breath sounds: Normal breath sounds.   Abdominal:      General: Bowel sounds are normal.   Skin:     General: Skin is warm and dry.   Neurological:      General: No focal deficit present.      Mental Status: She is alert.   Psychiatric:         Thought Content: Thought content normal.         Judgment: Judgment normal.            Signed Electronically by: MATILDA RODAS DO

## 2024-11-18 LAB
HPV HR 12 DNA CVX QL NAA+PROBE: NEGATIVE
HPV16 DNA CVX QL NAA+PROBE: NEGATIVE
HPV18 DNA CVX QL NAA+PROBE: NEGATIVE
HUMAN PAPILLOMA VIRUS FINAL DIAGNOSIS: NORMAL

## 2024-11-19 NOTE — PROGRESS NOTES
Chief Complaint   Patient presents with    Hearing Problem     Hearing loss in both ears for years. Ears popping in the mornings. No pain or ringing     History of Present Illness   Christine Verdugo is a 59 year old female who presents to me today for ear evaluation.  The patient reports hearing loss in both ears for years.  It was gradual in onset.  The patient has noticed increased difficulty hearing certain sounds and difficulty in understanding others, especially in noisy or crowded situations.  There is no history of recent head trauma. She does have a history chronic ear disease and tubes as a child.  The patient reports exposure to  recreational, no , and no work-related noise exposure.  No family history of hearing loss at a young age. The patient describes when standing up fast she feels like the room is spinning. She denies otorrhea or otalgia.     Past Medical History  Patient Active Problem List   Diagnosis    Anxiety    CARDIOVASCULAR SCREENING; LDL GOAL LESS THAN 160    Major depression in complete remission (H)     Current Medications     Current Outpatient Medications:     diphenhydrAMINE-acetaminophen (TYLENOL PM)  MG tablet, Take 2 tablets by mouth nightly as needed for sleep., Disp: , Rfl:     fluticasone (FLONASE) 50 MCG/ACT nasal spray, Spray 1-2 sprays into both nostrils daily., Disp: 1 Package, Rfl: 11    hydrOXYzine HCl (ATARAX) 25 MG tablet, Take 1 tablet (25 mg) by mouth 3 times daily as needed for itching., Disp: 30 tablet, Rfl: 1    NO ACTIVE MEDICATIONS, ., Disp: , Rfl:     omeprazole (PRILOSEC OTC) 20 MG EC tablet, Take 20 mg by mouth daily., Disp: , Rfl:     Allergies  Allergies   Allergen Reactions    Erythromycin Rash    Penicillins Other (See Comments)     Comment: Hives, Description:       Social History   Social History     Socioeconomic History    Marital status:     Number of children: 2   Tobacco Use    Smoking status: Former     Current packs/day: 0.00      Types: Cigarettes     Quit date: 3/1/2009     Years since quitting: 15.7     Passive exposure: Never    Smokeless tobacco: Never   Vaping Use    Vaping status: Every Day    Substances: Nicotine   Substance and Sexual Activity    Alcohol use: Not Currently    Drug use: No   Other Topics Concern    Parent/sibling w/ CABG, MI or angioplasty before 65F 55M? No   Social History Narrative    Lives at home  and daughter, brother in law and mother in law      Social Drivers of Health     Financial Resource Strain: Low Risk  (11/12/2024)    Financial Resource Strain     Within the past 12 months, have you or your family members you live with been unable to get utilities (heat, electricity) when it was really needed?: No   Food Insecurity: Low Risk  (11/12/2024)    Food Insecurity     Within the past 12 months, did you worry that your food would run out before you got money to buy more?: No     Within the past 12 months, did the food you bought just not last and you didn t have money to get more?: No   Transportation Needs: Low Risk  (11/12/2024)    Transportation Needs     Within the past 12 months, has lack of transportation kept you from medical appointments, getting your medicines, non-medical meetings or appointments, work, or from getting things that you need?: No   Physical Activity: Sufficiently Active (11/12/2024)    Exercise Vital Sign     Days of Exercise per Week: 5 days     Minutes of Exercise per Session: 30 min   Stress: No Stress Concern Present (11/12/2024)    [employer] - Occupational Stress Questionnaire     Feeling of Stress : Only a little   Social Connections: Unknown (11/12/2024)    Social Connection and Isolation Panel [NHANES]     Frequency of Social Gatherings with Friends and Family: Never   Interpersonal Safety: Low Risk  (11/15/2024)    Interpersonal Safety     Do you feel physically and emotionally safe where you currently live?: Yes     Within the past 12  "months, have you been hit, slapped, kicked or otherwise physically hurt by someone?: No     Within the past 12 months, have you been humiliated or emotionally abused in other ways by your partner or ex-partner?: No   Housing Stability: Low Risk  (11/12/2024)    Housing Stability     Do you have housing? : Yes     Are you worried about losing your housing?: No       Family History  Family History   Problem Relation Age of Onset    Coronary Artery Disease Father     Diabetes Father     Alcohol/Drug Father     Breast Cancer Sister 32    Breast Cancer Maternal Grandmother     Breast Cancer Maternal Aunt        Review of Systems  As per HPI and PMHx, otherwise 10+ comprehensive system review is negative.    Physical Exam  BP 99/63 (BP Location: Right arm, Patient Position: Sitting, Cuff Size: Adult Regular)   Pulse 90   Ht 1.6 m (5' 3\")   Wt 51.5 kg (113 lb 10 oz)   LMP  (LMP Unknown)   SpO2 100%   BMI 20.13 kg/m    GENERAL: Patient is a pleasant, cooperative 59 year old female in no acute distress.  HEAD: Normocephalic, atraumatic.  Hair and scalp are normal.  EYES: Pupils are equal, round, reactive to light and accommodation.  Extraocular movements are intact.  The sclera nonicteric without injection.  The extraocular structures are normal.  EARS: Normal shape and symmetry.  No tenderness when palpating the mastoid or tragal areas bilaterally.  Otoscopic exam reveals no amount of cerumen bilaterally.  The bilateral tympanic membranes are round, bilateral tympanosclerosis  intact without evidence of effusion, good landmarks.  No retraction, granulation, or drainage.  NOSE: Nares are patent.  Nasal mucosa is pink.  ORAL CAVITY: Dentition is in good repair.  Mucous membranes are moist.  Tongue is mobile, protrudes to the midline.  Palate elevates symmetrically.  Tonsils are +1.  No erythema or exudate.  No oral cavity or oropharyngeal masses, lesions, ulcerations, leukoplakia.  NECK: Supple, trachea is midline.  " There no palpable cervical lymphadenopathy or masses bilaterally.  Palpation of the bilateral parotid and submandibular areas reveal no masses.  No thyromegaly.    NEUROLOGIC: Cranial nerves II through XII are grossly intact.  Voice is strong.  Patient is House-Brackmann I/VI bilaterally.  CARDIOVASCULAR: Extremities are warm and well-perfused.  No significant peripheral edema.  RESPIRATORY: Patient has nonlabored breathing without cough, wheeze, stridor.  PSYCHIATRIC: Patient is alert and oriented.  Mood and affect appear normal.  SKIN: Warm and dry.  No scalp, face, or neck lesions noted.    Audiogram  The patient underwent an audiogram performed today.  My review of the audiogram shows mixed hearing loss.  Pure-tone average is 40 dB on the right and 61 dB on the left.  Speech reception threshold is 35 dB on the right and 60 dB on the left.  The patient had 96% word recognition on the right and 72% word recognition on the left.  The patient had a A tympanogram on the right and a A tympanogram on the left.      Assessment and Plan     ICD-10-CM    1. Asymmetrical sensorineural hearing loss  H90.3       2. Mixed conductive and sensorineural hearing loss of right ear with restricted hearing of left ear  H90.A31       3. Sensorineural hearing loss (SNHL) of left ear with unrestricted hearing of right ear  H90.42         It was my pleasure seeing Christine OROZCO Jahphoebe today in clinic.  The patient presents today with hearing loss.  This is most consistent with presbycusis. I can find no evidence of serious CNS disorders or other complicating factors that could be causing this.  We spent the remainder of today's visit on education. We discussed hearing protection in noisy environments.    The patient is medically cleared for consideration of a hearing aid evaluation.    The patient will follow up as necessary for worsening symptoms or changes in symptoms. I have also recommended repeat audiogram in 1 years, or sooner if  symptoms warrant.      ROSARIO Hyman [facility]  Otolaryngology  [place] [facility] [place]     Post-Care Instructions: I reviewed with the patient in detail post-care instructions. Patient is to wear sunprotection, and avoid picking at any of the treated lesions. Pt may apply Vaseline to crusted or scabbing areas. Duration Of Freeze Thaw-Cycle (Seconds): 0 Render Post-Care Instructions In Note?: no Number Of Freeze-Thaw Cycles: 1 freeze-thaw cycle Detail Level: Detailed Show Aperture Variable?: Yes Consent: The patient's consent was obtained including but not limited to risks of crusting, scabbing, blistering, scarring, darker or lighter pigmentary change, recurrence, incomplete removal and infection.

## 2024-11-21 ENCOUNTER — OFFICE VISIT (OUTPATIENT)
Dept: OTOLARYNGOLOGY | Facility: CLINIC | Age: 59
End: 2024-11-21
Payer: COMMERCIAL

## 2024-11-21 VITALS
BODY MASS INDEX: 20.13 KG/M2 | OXYGEN SATURATION: 100 % | HEART RATE: 90 BPM | SYSTOLIC BLOOD PRESSURE: 99 MMHG | HEIGHT: 63 IN | DIASTOLIC BLOOD PRESSURE: 63 MMHG | WEIGHT: 113.63 LBS

## 2024-11-21 DIAGNOSIS — H90.3 ASYMMETRICAL SENSORINEURAL HEARING LOSS: Primary | ICD-10-CM

## 2024-11-21 DIAGNOSIS — H90.5 SENSORINEURAL HEARING LOSS (SNHL) OF LEFT EAR, UNSPECIFIED HEARING STATUS ON CONTRALATERAL SIDE: ICD-10-CM

## 2024-11-21 DIAGNOSIS — H90.A31 MIXED CONDUCTIVE AND SENSORINEURAL HEARING LOSS OF RIGHT EAR WITH RESTRICTED HEARING OF LEFT EAR: ICD-10-CM

## 2024-11-21 DIAGNOSIS — H90.42 SENSORINEURAL HEARING LOSS (SNHL) OF LEFT EAR WITH UNRESTRICTED HEARING OF RIGHT EAR: ICD-10-CM

## 2024-11-21 LAB
BKR AP ASSOCIATED HPV REPORT: NORMAL
BKR LAB AP GYN ADEQUACY: NORMAL
BKR LAB AP GYN INTERPRETATION: NORMAL
BKR LAB AP PREVIOUS ABNORMAL: NORMAL
PATH REPORT.COMMENTS IMP SPEC: NORMAL
PATH REPORT.COMMENTS IMP SPEC: NORMAL
PATH REPORT.RELEVANT HX SPEC: NORMAL

## 2024-11-21 ASSESSMENT — PAIN SCALES - GENERAL: PAINLEVEL_OUTOF10: NO PAIN (0)

## 2024-11-21 NOTE — PATIENT INSTRUCTIONS
You were seen by Cherise Zavala CNP.  If you have questions or concerns regarding your appointment today, you can reach out to our call center at [phone].  The following has been recommended at your appointment today:      Schedule a hearing aid consult.   Follow up in 1 year with ENT or sooner if issues.

## 2024-11-21 NOTE — LETTER
11/21/2024      Christine Verdugo  [address]  [facility] [address]      Dear Colleague,    Thank you for referring your patient, Christine Verdugo, to the [facility]. Please see a copy of my visit note below.    Chief Complaint   Patient presents with     Hearing Problem     Hearing loss in both ears for years. Ears popping in the mornings. No pain or ringing     History of Present Illness   Christine Verdugo is a 59 year old female who presents to me today for ear evaluation.  The patient reports hearing loss in both ears for years.  It was gradual in onset.  The patient has noticed increased difficulty hearing certain sounds and difficulty in understanding others, especially in noisy or crowded situations.  There is no history of recent head trauma. She does have a history chronic ear disease and tubes as a child.  The patient reports exposure to  recreational, no , and no work-related noise exposure.  No family history of hearing loss at a young age. The patient describes when standing up fast she feels like the room is spinning. She denies otorrhea or otalgia.     Past Medical History  Patient Active Problem List   Diagnosis     Anxiety     CARDIOVASCULAR SCREENING; LDL GOAL LESS THAN 160     Major depression in complete remission (H)     Current Medications     Current Outpatient Medications:      diphenhydrAMINE-acetaminophen (TYLENOL PM)  MG tablet, Take 2 tablets by mouth nightly as needed for sleep., Disp: , Rfl:      fluticasone (FLONASE) 50 MCG/ACT nasal spray, Spray 1-2 sprays into both nostrils daily., Disp: 1 Package, Rfl: 11     hydrOXYzine HCl (ATARAX) 25 MG tablet, Take 1 tablet (25 mg) by mouth 3 times daily as needed for itching., Disp: 30 tablet, Rfl: 1     NO ACTIVE MEDICATIONS, ., Disp: , Rfl:      omeprazole (PRILOSEC OTC) 20 MG EC tablet, Take 20 mg by mouth daily., Disp: , Rfl:     Allergies  Allergies   Allergen Reactions     Erythromycin Rash      Penicillins Other (See Comments)     Comment: Hives, Description:       Social History   Social History     Socioeconomic History     Marital status:      Number of children: 2   Tobacco Use     Smoking status: Former     Current packs/day: 0.00     Types: Cigarettes     Quit date: 3/1/2009     Years since quitting: 15.7     Passive exposure: Never     Smokeless tobacco: Never   Vaping Use     Vaping status: Every Day     Substances: Nicotine   Substance and Sexual Activity     Alcohol use: Not Currently     Drug use: No   Other Topics Concern     Parent/sibling w/ CABG, MI or angioplasty before 65F 55M? No   Social History Narrative    Lives at home  and daughter, brother in law and mother in law      Social Drivers of Health     Financial Resource Strain: Low Risk  (11/12/2024)    Financial Resource Strain      Within the past 12 months, have you or your family members you live with been unable to get utilities (heat, electricity) when it was really needed?: No   Food Insecurity: Low Risk  (11/12/2024)    Food Insecurity      Within the past 12 months, did you worry that your food would run out before you got money to buy more?: No      Within the past 12 months, did the food you bought just not last and you didn t have money to get more?: No   Transportation Needs: Low Risk  (11/12/2024)    Transportation Needs      Within the past 12 months, has lack of transportation kept you from medical appointments, getting your medicines, non-medical meetings or appointments, work, or from getting things that you need?: No   Physical Activity: Sufficiently Active (11/12/2024)    Exercise Vital Sign      Days of Exercise per Week: 5 days      Minutes of Exercise per Session: 30 min   Stress: No Stress Concern Present (11/12/2024)    [employer] - Occupational Stress Questionnaire      Feeling of Stress : Only a little   Social Connections: Unknown (11/12/2024)    Social  "Connection and Isolation Panel [NHANES]      Frequency of Social Gatherings with Friends and Family: Never   Interpersonal Safety: Low Risk  (11/15/2024)    Interpersonal Safety      Do you feel physically and emotionally safe where you currently live?: Yes      Within the past 12 months, have you been hit, slapped, kicked or otherwise physically hurt by someone?: No      Within the past 12 months, have you been humiliated or emotionally abused in other ways by your partner or ex-partner?: No   Housing Stability: Low Risk  (11/12/2024)    Housing Stability      Do you have housing? : Yes      Are you worried about losing your housing?: No       Family History  Family History   Problem Relation Age of Onset     Coronary Artery Disease Father      Diabetes Father      Alcohol/Drug Father      Breast Cancer Sister 32     Breast Cancer Maternal Grandmother      Breast Cancer Maternal Aunt        Review of Systems  As per HPI and PMHx, otherwise 10+ comprehensive system review is negative.    Physical Exam  BP 99/63 (BP Location: Right arm, Patient Position: Sitting, Cuff Size: Adult Regular)   Pulse 90   Ht 1.6 m (5' 3\")   Wt 51.5 kg (113 lb 10 oz)   LMP  (LMP Unknown)   SpO2 100%   BMI 20.13 kg/m    GENERAL: Patient is a pleasant, cooperative 59 year old female in no acute distress.  HEAD: Normocephalic, atraumatic.  Hair and scalp are normal.  EYES: Pupils are equal, round, reactive to light and accommodation.  Extraocular movements are intact.  The sclera nonicteric without injection.  The extraocular structures are normal.  EARS: Normal shape and symmetry.  No tenderness when palpating the mastoid or tragal areas bilaterally.  Otoscopic exam reveals no amount of cerumen bilaterally.  The bilateral tympanic membranes are round, bilateral tympanosclerosis  intact without evidence of effusion, good landmarks.  No retraction, granulation, or drainage.  NOSE: Nares are patent.  Nasal mucosa is pink.  ORAL CAVITY: " Dentition is in good repair.  Mucous membranes are moist.  Tongue is mobile, protrudes to the midline.  Palate elevates symmetrically.  Tonsils are +1.  No erythema or exudate.  No oral cavity or oropharyngeal masses, lesions, ulcerations, leukoplakia.  NECK: Supple, trachea is midline.  There no palpable cervical lymphadenopathy or masses bilaterally.  Palpation of the bilateral parotid and submandibular areas reveal no masses.  No thyromegaly.    NEUROLOGIC: Cranial nerves II through XII are grossly intact.  Voice is strong.  Patient is House-Brackmann I/VI bilaterally.  CARDIOVASCULAR: Extremities are warm and well-perfused.  No significant peripheral edema.  RESPIRATORY: Patient has nonlabored breathing without cough, wheeze, stridor.  PSYCHIATRIC: Patient is alert and oriented.  Mood and affect appear normal.  SKIN: Warm and dry.  No scalp, face, or neck lesions noted.    Audiogram  The patient underwent an audiogram performed today.  My review of the audiogram shows mixed hearing loss.  Pure-tone average is 40 dB on the right and 61 dB on the left.  Speech reception threshold is 35 dB on the right and 60 dB on the left.  The patient had 96% word recognition on the right and 72% word recognition on the left.  The patient had a A tympanogram on the right and a A tympanogram on the left.      Assessment and Plan     ICD-10-CM    1. Asymmetrical sensorineural hearing loss  H90.3       2. Mixed conductive and sensorineural hearing loss of right ear with restricted hearing of left ear  H90.A31       3. Sensorineural hearing loss (SNHL) of left ear with unrestricted hearing of right ear  H90.42         It was my pleasure seeing Christine Verdugo today in clinic.  The patient presents today with hearing loss.  This is most consistent with presbycusis. I can find no evidence of serious CNS disorders or other complicating factors that could be causing this.  We spent the remainder of today's visit on education. We  discussed hearing protection in noisy environments.    The patient is medically cleared for consideration of a hearing aid evaluation.    The patient will follow up as necessary for worsening symptoms or changes in symptoms. I have also recommended repeat audiogram in 1 years, or sooner if symptoms warrant.      ROSARIO Hyman CNP  Otolaryngology  [place] [facility] [place]      Again, thank you for allowing me to participate in the care of your patient.        Sincerely,        ROSARIO Hyman CNP

## 2024-11-29 PROBLEM — Z90.710 HX OF HYSTERECTOMY: Status: ACTIVE | Noted: 2024-11-29

## 2024-12-18 ENCOUNTER — OFFICE VISIT (OUTPATIENT)
Dept: AUDIOLOGY | Facility: CLINIC | Age: 59
End: 2024-12-18
Payer: COMMERCIAL

## 2024-12-18 DIAGNOSIS — H90.A21 SENSORINEURAL HEARING LOSS (SNHL) OF RIGHT EAR WITH RESTRICTED HEARING OF LEFT EAR: ICD-10-CM

## 2024-12-18 DIAGNOSIS — H90.A32 MIXED CONDUCTIVE AND SENSORINEURAL HEARING LOSS OF LEFT EAR WITH RESTRICTED HEARING OF RIGHT EAR: Primary | ICD-10-CM

## 2024-12-18 NOTE — PROGRESS NOTES
AUDIOLOGY REPORT    SUBJECTIVE: Christine Verdugo is a 59 year old female was seen in the Audiology Clinic at [facility] on 12/18/24 to discuss concerns with hearing and functional communication difficulties. Christine has been seen previously on 11/14/2024, and results revealed a Mixed loss of the left ear and sensorineural hearing loss of the right ear.  The patient was medically evaluated and determined to be cleared for binaural hearing aids by Cherise PONCE CNP. Christine notes difficulty with communication in a variety of listening situations. Patient was unaccompanied to today's visit.     OBJECTIVE:  Patient is a hearing aid candidate. Patient would like to move forward with a hearing aid evaluation today. Therefore, the patient was presented with different options for amplification to help aid in communication. Discussed styles, levels of technology and monaural vs. binaural fitting.     The hearing aid(s) mutually chosen were:  Binaural: Phonak Audeo I70-R  COLOR: P5   BATTERY SIZE: rechargeable  EARMOLD/TIPS: Medium power domes   CANAL/ LENGTH: 2 MP L/R    ASSESSMENT:   Mixed loss of the left ear and sensorineural hearing loss of the right ear    Reviewed purchase information and warranty information with patient. The 45 day trial period was explained to patient. The patient was given a copy of the [place] Department of Health consumer brochure on purchasing hearing instruments. Patient risk factors have been provided to the patient in writing prior to the sale of the hearing aid per FDA regulation. The risk factors are also available in the User Instructional Booklet to be presented on the day of the hearing aid fitting. Hearing aid(s) ordered. Hearing aid evaluation completed. Patient was advised to check with their insurance to ascertain of they are eligible for any hearing aid benefits.     PLAN: Christine is scheduled to return in 2-3 weeks for a hearing aid  fitting and programming. Purchase agreement will be completed on that date. Please contact this clinic with any questions or concerns.      Woo Hernandez CCC-A  Licensed Audiologist #[id number]  12/18/2024

## 2025-01-08 ENCOUNTER — OFFICE VISIT (OUTPATIENT)
Dept: AUDIOLOGY | Facility: CLINIC | Age: 60
End: 2025-01-08
Payer: COMMERCIAL

## 2025-01-08 DIAGNOSIS — H90.A21 SENSORINEURAL HEARING LOSS (SNHL) OF RIGHT EAR WITH RESTRICTED HEARING OF LEFT EAR: Primary | ICD-10-CM

## 2025-01-08 DIAGNOSIS — H90.A32 MIXED CONDUCTIVE AND SENSORINEURAL HEARING LOSS OF LEFT EAR WITH RESTRICTED HEARING OF RIGHT EAR: ICD-10-CM

## 2025-01-08 NOTE — PROGRESS NOTES
AUDIOLOGY REPORT    SUBJECTIVE: Christine Verdugo, a 59 year old female, was seen in the Audiology Clinic at [facility] today for a Binaural hearing aid fitting. Previous results have revealed a bilateral hearing loss. The patient was given medical clearance to pursue amplification by  Cherise PONCE CNP. Patient was unaccompanied to today's visit.        OBJECTIVE:  Prior to fitting, a hearing aid check was performed to ensure device functionality. The hearing aid conformity evaluation was completed.The hearing aids were placed and they provided a good fit. Real-ear-probe-microphone measurements were completed on the [employer] system and were a tolerable match to NAL-NL2 target with soft sounds audible, moderate sounds comfortable, and loud sounds below discomfort. UCLs are verified through maximum power output measures and demonstrate appropriate limiting of loud inputs. Ms. Verdugo was oriented to proper hearing aid use, care, cleaning (no water, dry brush), batteries (size rechargeable, insertion/removal, toxicity, low-battery signal), aid insertion/removal, user booklet, warranty information, storage cases, and other hearing aid details. The patient confirmed understanding of hearing aid use and care, and showed proper insertion of hearing aid and batteries while in the office today. Ms. Verdugo reported good volume and sound quality today.    EAR(S) FIT: Binaural  MA HEARING AID MAKE: Right: Phonak Emily I70R; Left: Phonak Emily I70R    MA HEARING AID MODEL #: Right: [id number]; Left: [id number]  HEARING AID STYLE: Right: CLIVE; Left: CLIVE  DOME SIZE: Right:  Medium vented; Left::  Medium power   LENGTH: Right:  2 MP; Left:  2 MP  SERIAL NUMBERS: Right: [id number]; Left: [id number]  WARRANTY END DATE: Right: 1/17/2028; Left:: 1/17/2028    ASSESSMENT: Binaural hearing aid fitting completed today. Verification measures were performed. The 45 day trial period was  explained to patient, and they expressed understanding. Ms. Verdugo signed the Hearing Aid Purchase Agreement and was given a copy, as well as details on her hearing aids. Patient was counseled that exact out of pocket amounts cannot be determined for hearing aid claims being sent to insurance. Any insurance coverage information presented to the patient is an estimate only, and is not a guarantee of payment. Patient has been advised to check with their own insurance.    PLAN: Ms. Verdugo will return for follow-up in 2-3 weeks for a hearing aid review appointment. Please call this clinic with questions regarding today s appointment.    Woo Hernandez [facility]  Licensed Audiologist #[id number]  1/8/2025

## 2025-01-08 NOTE — PATIENT INSTRUCTIONS

## 2025-02-26 ENCOUNTER — OFFICE VISIT (OUTPATIENT)
Dept: AUDIOLOGY | Facility: CLINIC | Age: 60
End: 2025-02-26
Payer: COMMERCIAL

## 2025-02-26 DIAGNOSIS — H90.A32 MIXED CONDUCTIVE AND SENSORINEURAL HEARING LOSS OF LEFT EAR WITH RESTRICTED HEARING OF RIGHT EAR: ICD-10-CM

## 2025-02-26 DIAGNOSIS — H90.A21 SENSORINEURAL HEARING LOSS (SNHL) OF RIGHT EAR WITH RESTRICTED HEARING OF LEFT EAR: Primary | ICD-10-CM

## 2025-02-26 NOTE — PROGRESS NOTES
AUDIOLOGY REPORT    SUBJECTIVE:Christine Verdugo is a 59 year old female who was seen in the Audiology Clinic at the [facility] on 2/26/2025  for a follow-up check regarding the fitting of new hearing aids. Previous results have revealed bilateral hearing loss.  The patient has been seen previously in this clinic and was fit with binaural hearing aids on 1/8/2025.  Christine reports good sound quality with the hearing aid(s) and increased wear time with the heaing aids. Patient was unaccompanied to today's visit.     OBJECTIVE:     Based on patient report, the following changes were made; None.    Reviewed 45 day trial period, care, cleaning (no water, dry brush), batteries (size rechargeable) insertion/removal, toxicity, low-battery signal), aid insertion/removal, volume adjustment (if applicable), user booklet, warranty information, storage cases, and other hearing aid details.        ASSESSMENT: A follow-up appointment for hearing aid fitting was completed today.  Changes to hearing aid was completed as outlined above.     PLAN:Christine will return for follow-up as needed, or at least every 9-12 months for cleaning and assessment of hearing aid.   Please call this clinic with any questions regarding today s appointment.    Woo Hernandez CCC-A  Licensed Audiologist #[id number]  2/26/2025

## 2025-04-02 ENCOUNTER — VIRTUAL VISIT (OUTPATIENT)
Dept: ONCOLOGY | Facility: CLINIC | Age: 60
End: 2025-04-02
Attending: FAMILY MEDICINE
Payer: COMMERCIAL

## 2025-04-02 DIAGNOSIS — Z80.0 FAMILY HISTORY OF COLON CANCER: ICD-10-CM

## 2025-04-02 DIAGNOSIS — Z80.42 FAMILY HISTORY OF PROSTATE CANCER: ICD-10-CM

## 2025-04-02 DIAGNOSIS — Z80.3 FAMILY HISTORY OF MALIGNANT NEOPLASM OF BREAST: Primary | ICD-10-CM

## 2025-04-02 PROCEDURE — 96041 GENETIC COUNSELING SVC EA 30: CPT | Mod: GT,95 | Performed by: GENETIC COUNSELOR, MS

## 2025-04-02 NOTE — PATIENT INSTRUCTIONS
Assessing Cancer Risk  Cancer is a common diagnosis which impacts many families.  Individuals may develop cancer due to environmental factors (such as exposures and lifestyle), aging, genetic predisposition, or a combination of these factors.      Only about 5-10% of cancers are thought to be due to an inherited cancer susceptibility gene.    These families often have:  Several people with the same or related types of cancer  Cancers diagnosed at a young age (before age 50)  Individuals with more than one primary cancer  Multiple generations of the family affected with cancer    Comprehensive Breast and Gynecologic Cancer Panel  We each inherit two copies of every gene in our bodies: one from our mother, and one from our father. Each gene has a specific job to do.  When a gene has a mistake or  mutation  in it, it does not work like it should.     Some people may be candidates for genetic testing of more than one gene.  For these families, genetic testing using a cancer panel may be offered. These panels will test different genes at once known to increase the risk for breast, ovarian, uterine, and/or other cancers.    This handout will review common hereditary breast and gynecologic cancer syndromes. The genes that will be discussed in this handout are: BIJAL, BRCA1, BRCA2, BRIP1, CDH1, CHEK2, MLH1, MSH2, MSH6, PMS2, EPCAM, PTEN, PALB2, RAD51C, RAD51D, and TP53.    The purpose of this handout is to serve as a brief summary of the breast and gynecologic cancer risk genes that have published clinical management guidelines for individuals who are found to carry a mutation. Inheriting a mutation does not mean a person will develop cancer, but it does significantly increase their risk above the general population risk.     ______________________________________________________________________________    Hereditary Breast and Ovarian Cancer Syndrome (BRCA1 and BRCA2)  A single mutation in one of the copies of BRCA1 or  BRCA2 increases the risk for breast and ovarian cancer, among others.  The risk for pancreatic cancer and melanoma may also be slightly increased in some families.  The chart below shows the chance that someone with a BRCA mutation would develop cancer in his or her lifetime1,2,3,4.       Lifetime Cancer Risks    General Population BRCA1  BRCA2   Breast  12% >60% >60%   Ovarian  1-2% 39-58% 13-29%   Prostate 12% 7-26% 19-61%   Male Breast 0.1% 0.2-1.2% 1.8-7.1%   Pancreas 1-2% Up to 5% 5-10%     A person s ethnic background is also important to consider, as individuals of Ashkenazi Lutheran ancestry have a higher chance of having a BRCA gene mutation.  There are three BRCA mutations that occur more frequently in this population.      Christian Syndrome (MLH1, MSH2, MSH6, PMS2, and EPCAM)  Currently five genes are known to cause Christian Syndrome: MLH1, MSH2, MSH6, PMS2, and EPCAM.  A single mutation in one of the Christian Syndrome genes increases the risk for colon, endometrial, ovarian, and stomach cancers.  Other cancers that occur less commonly in Christian Syndrome include urinary tract, skin, and brain cancers.  The chart below shows the chance that a person with Christian syndrome would develop cancer in his or her lifetime5.      Lifetime Cancer Risks    General Population Christian Syndrome   Colon 5% 10-61%   Endometrial 3% 13-57%   Ovarian 1-2% 1-38%   Stomach <1% 1-9%   *Cancer risk varies depending on Christian syndrome gene found      Cowden Syndrome (PTEN)  Cowden syndrome is a hereditary condition that increases the risk for breast, thyroid, endometrial, colon, and kidney cancer.  Cowden syndrome is caused by a mutation in the PTEN gene.  A single mutation in one of the copies of PTEN causes Cowden syndrome and increases cancer risk.  The chart below shows the chance that someone with a PTEN mutation would develop cancer in their lifetime6,7.  Other benign features seen in some individuals with Cowden syndrome include benign  skin lesions (facial papules, keratoses, lipomas), learning disability, autism, thyroid nodules, colon polyps, and larger head size.     Lifetime Cancer Risks    General Population Cowden   Breast 12% 40-60%*   Thyroid 1% Up to 38%   Renal 1-2% Up to 35%   Endometrial 3% Up to 28%   Colon 5% Up to 9%   Melanoma 2-3% Up to 6%   *Emerging data suggests the risk for breast cancer could be greater than 60%               Li-Fraumeni Syndrome (TP53)  Li-Fraumeni Syndrome (LFS) is a cancer predisposition syndrome caused by a mutation in the TP53 gene. A single mutation in one of the copies of TP53 increases the risk for multiple cancers. Individuals with LFS are at an increased risk for developing cancer at a young age. The lifetime risk for development of a LFS-associated cancer is 50% by age 30 and 90% by age 60.   Core Cancers: Sarcomas, Breast, Brain, Lung, Leukemias/Lymphomas, Adrenocortical carcinomas  Other Cancers: Gastrointestinal, Thyroid, Skin, Genitourinary       Hereditary Diffuse Gastric Cancer (CDH1)  Currently, one gene is known to cause hereditary diffuse gastric cancer (HDGC): CDH1.  Individuals with HDGC are at increased risk for diffuse gastric cancer and lobular breast cancer. Of people diagnosed with HDGC, 30-50% have a mutation in the CDH1 gene.  This suggests there are likely other genes that may cause HDGC that have not been identified yet.      Lifetime Cancer Risks    General Population HDGC   Diffuse Gastric  <1% ~80%   Breast 12% 41-60%       Additional Genes    BIJAL  BIJAL is a moderate-risk breast cancer gene. Women who have a mutation in BIJAL can have between a 2-4 fold increased risk for breast cancer compared to the general population8. BIJAL mutations have also been associated with increased risk for pancreatic cancer between 5-10%9. Individuals who inherit two BIJAL mutations have a condition called ataxia-telangiectasia (AT).  This rare autosomal recessive condition affects the nervous system  and immune system, and is associated with progressive cerebellar ataxia beginning in childhood. Individuals with ataxia-telangiectasia often have a weakened immune system and have an increased risk for childhood cancers.    PALB2  Mutations in PALB2 have been shown to increase the risk of breast cancer up to 41-60% in some families; where individuals fall within this risk range is dependent upon family [id number]. PALB2 mutations have also been associated with increased risk for pancreatic cancer between 5-10%.  Individuals who inherit two PALB2 mutations--one from their mother and one from their father--have a condition called Fanconi Anemia.  This rare autosomal recessive condition is associated with short stature, developmental delay, bone marrow failure, and increased risk for childhood cancers.    CHEK2   CHEK2 is a moderate-risk breast cancer gene.  Women who have a mutation in CHEK2 have around a 2-4 fold increased risk for breast cancer compared to the general population, and this risk may be higher depending upon family history.11,12,13 The risk of colon cancer may be twice as high as the general population risk of colon cancer of 5%. Mutations in CHEK2 have also been shown to increase the risk of other cancers, including prostate, however these cancer risks are currently not well understood.    BRIP1, RAD51C and RAD51D  Mutations in RAD51C and RAD51D have been shown to increase the risk of ovarian cancer and breast cancer 14,. Mutations in BRIP1 have been shown to increase the risk of ovarian cancer and possibly female breast cancer 15 .       Lifetime Cancer Risk    General Population        BRIP1   RAD51C  RAD51D   Breast 12% Not well defined 20-40% 20-40%   Ovarian 1-2% 5-15% 10-15% 10-20%     ______________________________________________________________  Inheritance  All of the cancer syndromes reviewed above are inherited in an autosomal dominant pattern.  This means that if a parent has a mutation,  each of their children will have a 50% chance of inheriting that same mutation. Therefore, each child --male or female-- would have a 50% chance of being at increased risk for developing cancer.    Image obtained from Genetics Home Reference, 2013     Mutations in some genes can occur de annalee, which means that a person s mutation occurred for the first time in them and was not inherited from a parent.  Now that they have the mutation, however, it can be passed on to future generations.    Genetic Testing  Genetic testing involves a blood test and will look for any harmful mutations that are associated with increased cancer risk.  If possible, it is recommended that the person(s) who has had cancer be tested before other family members.  That person will give us the most useful information about whether or not a specific gene is associated with the cancer in the family.    Results  There are three possible results of genetic testing:  Positive--a harmful mutation was identified in one or more of the genes  Negative--no mutations were identified in any of the genes tested  Variant of unknown significance--a variation in one of the genes was identified, but it is unclear how this impacts cancer risk in the family    Advantages and Disadvantages   There are advantages and disadvantages to genetic testing.    Advantages  May clarify your cancer risk  Can help you make medical decisions  May explain the cancers in your family  May give useful information to your family members (if you share your results)    Disadvantages  Possible negative emotional impact of learning about inherited cancer risk  Uncertainty in interpreting a negative test result in some situations  Possible genetic discrimination concerns (see below)    Genetic Information Nondiscrimination Act (REYNALDO)  The Genetic Information Nondiscrimination Act of 2008 (REYNALDO) is a federal law that protects individuals from health insurance or employment discrimination  based on a genetic test result alone (with some exceptions, including employers with fewer than 15 employees, and ).  Although rare, REYNALDO  does not cover discrimination protections in terms of life insurance, long term care, or disability insurances.  Visit the National Human "[employer] Research [place] website to learn more.    Reducing Cancer Risk  All of the genes described in this handout have nationally recognized cancer screening guidelines that would be recommended for individuals who test positive.  In addition to increased cancer screening, surgeries may be offered or recommended to reduce cancer risk.  Recommendations are based upon an individual s genetic test result as well as their personal and family history of cancer.    Questions to Think About Regarding Genetic Testing:  What effect will the test result have on me and my relationship with my family members if I have an inherited gene mutation?  If I don t have a gene mutation?  Should I share my test results, and how will my family react to this news, which may also affect them?  Are my children ready to learn new information that may one day affect their own health?    Hereditary Cancer Resources    FORCE: Facing Our Risk of Cancer Empowered facingourrisk.org   Bright Pink bebrightpink.org   Li-Fraumeni Syndrome Association lfsassociation.org   PTEN World PTENworld.com   No stomach for cancer, Inc. nostomachforcancer.org   Stomach cancer relief network Scrnet.org   Collaborative Group of the Americas on Inherited Colorectal Cancer (CGA) cgaicc.com    Cancer Care cancercare.org   American Cancer Society (ACS) cancer.org   National Cancer [place] (NCI) cancer.gov     Please call us if you have any questions or concerns.   Cancer Risk Management Program [phone] ([phone])  Benny Carpenter, MS [facility] [place] [phone]  Jessica Garrett, MS, [facility] [phone]  Ami Allred, MS, [facility]  [phone]  Sharon Dia, MS, [facility]  [phone]  Karlie Blank,  MS, [facility] [place]  [phone]  Vicky Summers, MS, [facility] [place] [phone]  Rosina Coley, MS, [facility] [place] [phone]    References  Angela Barros PDP, Etienne S, Jon CORTEZ, Christina JE, Fabián JL, Sagrario N, Bassam H, Stiven O, Juan Antonio A, Pasini B, Radialice P, Mankaleigh S, Edu DM, Womack N, Ricardo E, Hemal H, Kendall E, Wai J, Gronjo J, Amber B, Tulinius H, Thorlacius S, Eerola H, Nevanlinna H, Maureen K, Maria Luisa OP. Average risks of breast and ovarian cancer associated with BRCA1 or BRCA2 mutations detected in case series unselected for family history: a combined analysis of 222 studies. Am J Hum Damaris. 2003;72:1117-30.  Stewart N, Lesly M, Christina G.  BRCA1 and BRCA2 Hereditary Breast and Ovarian Cancer. Gene Reviews online. 2013.  Phu YC, Gabriel S, Desean G, [place] S. Breast cancer risk among male BRCA1 and BRCA2 mutation carriers. J Natl Cancer Inst. 2007;99:1811-4.  Giovanny CLAY, Shannon I, Srikanth J, Prieto E, Betty ER, Ayde F. Risk of breast cancer in male BRCA2 carriers. J Med Damaris. 2010;47:710-1.  National Comprehensive Cancer Network. Clinical practice guidelines in oncology, colorectal cancer screening. Available online (registration required). 2015.  Naif MH, Lani J, Manjeet J, Zulay YOUNG, Junie MS, Eng C. Lifetime cancer risks in individuals with germline PTEN mutations. Clin Cancer Res. 2012;18:400-7.  Darrick R. Cowden Syndrome: A Critical Review of the Clinical Literature. J Damaris . 2009:18:13-27.  Miguel HART, Yuan ARANDA, Itzel S, Patti P, Kay T, Cheyenne M, Abdiaziz B, Judith H, Peyton R, Virginia K, Maciej L, Giovanny CLAY, Edu ARANDA, Yefri DF, Cristian MR, The Breast Cancer Susceptibility Collaboration ([place]) & Mirtha ALMENDAREZ. BIJAL mutations that cause ataxia-telangiectasia are breast cancer susceptibility alleles. Nature Genetics. 2006;38:873-875  Milton N , Corbin Y, Iman J, Shivani L, Bhavani VALDES , Anna ML, Leo S, Jacqueline AG, Jose S, Juan ML, Nikia J , Magali R, Cecilia NICHOLSON, Tj  JR, Dimas VE, Arlene M, Voboogiestein B, Suzy N, Ruddy RH, Tal KW, and Patrick AP. BIJAL mutations in patients with hereditary pancreatic cancer. Cancer Discover. 2012;2:41-46  Vick PEÑA., et al. Breast-Cancer Risk in Families with Mutations in PALB2. NEJM. 2014; 371(6):497-506.  CHEK2 Breast Cancer Case-Control Consortium. CHEK2*1100delC and susceptibility to breast cancer: A collaborative analysis involving 10,860 breast cancer cases and 9,065 controls from 10 studies. Am J Hum Damaris, 74 (2004), pp. 5429-2575  Jovany T, Wilmar S, Ping K, et al. Spectrum of Mutations in BRCA1, BRCA2, CHEK2, and TP53 in Families at High Risk of Breast Cancer. SAYRA. 2006;295(12):6238-7906.   Colette C, Maggie D, Arnold HART, et al. Risk of breast cancer in women with a CHEK2 mutation with and without a family history of breast cancer. J Clin Oncol. 2011;29:6891-9008.  Song H, Walis E, Ramus SJ, et al. Contribution of germline mutations in the RAD51B, RAD51C, and RAD51D genes to ovarian cancer in the population. J Clin Oncol. 2015;33(26):9046-4194. Doi:10.1200/JCO.2015.61.2408.  Luis Angel T, Joe DF, Gwen P, et al. Mutations in BRIP1 confer high risk of ovarian cancer. Yandy Damaris. 2011;43(11):8943-6607. doi:10.1038/ng.955.

## 2025-04-02 NOTE — PROGRESS NOTES
4/2/2025    Virtual Visit Details    Type of service:  Video Visit   Originating Location (pt. Location): Home  Distant Location (provider location):  Off-site  Platform used for Video Visit: June    Referring Provider: Inga Dumas DO     Presenting Information:   I spoke with Christine Verdugo over video today for genetic counseling to discuss her family history of cancer. This appointment was conducted virtually due to COVID-19 precautions. We talked today to review this history, cancer screening recommendations, and available genetic testing options.    Personal History:  Christine is a 60 year old female. She reports that she has a history of cervical cancer about 36 years ago (diagnosed soon after giving birth). She reports that she had something frozen off of her cervix and no further treatment was needed. Records not available at this time.    She had her first menstrual period at age 13 or 14, her first child at age 23, and is postmenopausal (she thinks she may have gone through menopause shortly after having her hysterectomy). She reports that she had a partial hysterectomy in 1997. Her ovaries are in place. She reports that she used an over the counter medication (estroven) after her hysterectomy for about 5-10 years. She has used oral contraceptives in the past. She has clinical breast exams and mammograms; her most recent mammogram on 10/24/24 was negative. Christine has not had a colonoscopy. She has a Cologuard sample collection kit, but has not yet completed this. Christine reported former tobacco use (quit 2 years ago, was smoking 1 pack per day) and no current alcohol use (quit 2 years ago).    Family History: (Please see scanned pedigree for detailed family history information)  Siblings:  Her sister was diagnosed with breast cancer around age 33. She had multiple surgeries, including a bilateral mastectomy. This was eventually metastatic to her bones and brain and she passed away at age 39 in  2012. Christine reports that she thought she might have had genetic testing, but now thinks that her sister was planning to have this and never had the test completed due to insurance issues.   Maternal:  Her mother is 79 years old with no known history of cancer.   Her uncle was diagnosed with throat cancer at an unknown age and passed away at age 59.   Her aunt was diagnosed with cervical cancer at an unknown age and passed away at age 65.  Another aunt was diagnosed with colon cancer at an unknown age and passed away at age 66.   Another uncle was diagnosed with skin cancer around age 86. He did have a significant history of sun exposure. He was then diagnosed with prostate cancer and colon cancer around the same time. He passed away at age 88.  She has five other aunts/uncles, all with no known history of cancer. One of these aunts is still alive, all others have passed away at older ages.   Her grandmother was diagnosed with breast cancer at an unknown age. This was metastatic throughout her body. She passed away at age 57.  Paternal:  Her father is 84 years old with no known history of cancer. He was adopted along with one of his biological sisters, but there is no other information known about his other biological relatives. His biological sister passed away at age 85 with no known history of cancer.     Christine is not aware of any genetic testing for inherited cancer risk in any of her family members.     Her maternal ethnicity (according to recent ancestry testing) is: Slovak, Bangladeshi, [place], [place], Northern , Central and Eastern . Her paternal ethnicity is: Slovak, Jordanian, Bangladeshi, Martiniquais. There is no known Ashkenazi Gnosticist ancestry on either side of her family.     Discussion:  Christine's family history of cancer is suggestive of a hereditary cancer syndrome.  We reviewed the features of sporadic, familial, and hereditary cancers. In looking at Christine's family history, it is  possible that a cancer susceptibility gene is present due to her sister's diagnosis of young breast cancer, as well as her maternal family history of multiple relatives with colon, breast, and other cancers.   We discussed the natural history and genetics of hereditary cancer. Based on her family history, we discussed genes in which mutations are associated with an increased risk for breast cancer, such as the BRCA1 and BRCA2 genes. Mutations in these genes cause a condition known as Hereditary Breast and Ovarian Cancer syndrome (HBOC). Women with a mutation in either of these genes are at increased risk for breast and ovarian cancer. There is also an increased risk for a second primary breast cancer. Men with a mutation in either of these genes are at increased risk for breast and prostate cancer. Both women and men may also be at increased risk for pancreatic cancer and melanoma.   A detailed handout regarding these genes and other genes in which mutations are associated with an increased risk for breast cancer will be provided to Christine via [employer] and can be found in the after visit summary. Topics included: inheritance pattern, cancer risks, cancer screening recommendations, and also risks, benefits and limitations of testing.   Based on her personal and family history, Christine meets current National Comprehensive Cancer Network (NCCN) criteria for genetic testing of high-penetrance breast cancer susceptibility genes, such as BRCA1, BRCA2, CDH1, PALB2, PTEN, STK11, and TP53.     We discussed that there are additional genes that could cause increased risk for breast, colon, prostate, and other cancers. As many of these genes present with overlapping features in a family and accurate cancer risk cannot always be established based upon the pedigree analysis alone, it would be reasonable for Christine to consider panel genetic testing to analyze multiple genes at once.  We reviewed genetic testing options for  Christine based on her personal and family history: a panel of genes associated with an increased risk for certain cancers, or larger panel options to include genes associated with increased risk for multiple different cancer types. She expressed an interest in more broad testing and opted for the Common Hereditary Cancers Panel.  Genetic testing is available for 48 genes associated with cancers of the breast, ovary, uterus, prostate and gastrointestinal system: [facility] Common Hereditary Cancers panel (APC, BIJAL, AXIN2, BAP1, BARD1, BMPR1A, BRCA1, BRCA2, BRIP1, CDH1, CDK4, CDKN2A, CHEK2, CTNNA1, DICER1, EPCAM, FH, GREM1, HOXB13, KIT, MBD4, MEN1, MLH1, MSH2, MSH3, MSH6, MUTYH, NF1, NTHL1, PALB2, PDGFRA, PMS2, POLD1, POLE, PTEN, RAD51C, RAD51D, SDHA, SDHB, SDHC, SDHD, SMAD4, SMARCA4, STK11, TP53, TSC1, TSC2, VHL).    We discussed that many of these genes are associated with specific hereditary cancer syndromes and published management guidelines: Hereditary Breast and Ovarian Cancer syndrome (BRCA1, BRCA2), Christian syndrome (MLH1, MSH2, MSH6, PMS2, EPCAM), Familial Adenomatous Polyposis (APC), Hereditary Diffuse Gastric Cancer (CDH1), Familial Atypical Multiple Mole Melanoma syndrome (CDK4, CDKN2A), Multiple Endocrine Neoplasia type 1 (MEN1), Juvenile Polyposis syndrome (BMPR1A, SMAD4), Cowden syndrome (PTEN), Li Fraumeni syndrome (TP53), Hereditary Paraganglioma and Pheochromocytoma syndrome (SDHA, SDHB, SDHC, SDHD), Peutz-Jeghers syndrome (STK11), MUTYH Associated Polyposis (MUTYH), Tuberous sclerosis complex (TSC1, TSC2), Von Hippel-Lindau disease (VHL), and Neurofibromatosis type 1 (NF1).   The BIJAL, AXIN2, BAP1, BRIP1, CHEK2, FH, GREM1, MBD4, MSH3, NTHL1, PALB2, POLD1, POLE, RAD51C, and RAD51D genes are associated with increased cancer risk and have published management guidelines for certain cancers.   The remaining genes (BARD1, CTNNA1, DICER1, HOXB13, KIT, PDGFRA, and SMARCA4) are associated with increased cancer  risk and may allow us to make medical recommendations when mutations are identified.   Due to COVID-19 precautions consent was obtained over the phone/video today. Genetic testing via the Common Hereditary Cancers Panel will be sent to [facility]. Christine opted to schedule a blood draw for testing. Turnaround time from date when sample is received at the lab: approximately 3-4 weeks.  Medical Management: For Christine, we reviewed that the information from genetic testing may determine:  additional cancer screening for which Christine may qualify (i.e. mammogram and breast MRI, more frequent colonoscopies, more frequent dermatologic exams, etc.),  options for risk reducing surgeries Christine could consider (i.e. bilateral mastectomy, surgery to remove her ovaries, etc.),    and targeted chemotherapies if she were to develop certain cancers in the future (i.e. immunotherapy for individuals with Christian syndrome, PARP inhibitors, etc.).   These recommendations will be discussed in detail once genetic testing is completed.     Plan:  1) Today Christine elected to proceed with genetic testing via the Common Hereditary Cancers Panel offered by [facility].  2) This information should be available in 3-4 weeks.  3) Christine will be scheduled for a virtual visit (phone or video) to discuss the results.    I spent 64 minutes on the date of the encounter doing chart review, virtual visit, documentation, and further activities as noted above.      Karlie Blank MS, [facility]  Licensed, Certified Genetic Counselor  Office: [phone]  Email: [email or web address]

## 2025-04-02 NOTE — LETTER
4/2/2025      Christine Verdugo  [address]  [facility] [address]      Dear Colleague,    Thank you for referring your patient, Christine Verdugo, to the [facility]. Please see a copy of my visit note below.    4/2/2025    Virtual Visit Details    Type of service:  Video Visit   Originating Location (pt. Location): Home  Distant Location (provider location):  Off-site  Platform used for Video Visit: [facility]    Referring Provider: Inga Dumas DO     Presenting Information:   I spoke with Christine Verdugo over video today for genetic counseling to discuss her family history of cancer. This appointment was conducted virtually due to COVID-19 precautions. We talked today to review this history, cancer screening recommendations, and available genetic testing options.    Personal History:  Christine is a 60 year old female. She reports that she has a history of cervical cancer about 36 years ago (diagnosed soon after giving birth). She reports that she had something frozen off of her cervix and no further treatment was needed. Records not available at this time.    She had her first menstrual period at age 13 or 14, her first child at age 23, and is postmenopausal (she thinks she may have gone through menopause shortly after having her hysterectomy). She reports that she had a partial hysterectomy in 1997. Her ovaries are in place. She reports that she used an over the counter medication (estroven) after her hysterectomy for about 5-10 years. She has used oral contraceptives in the past. She has clinical breast exams and mammograms; her most recent mammogram on 10/24/24 was negative. Christine has not had a colonoscopy. She has a Cologuard sample collection kit, but has not yet completed this. Christine reported former tobacco use (quit 2 years ago, was smoking 1 pack per day) and no current alcohol use (quit 2 years ago).    Family History: (Please see scanned pedigree for detailed  family history information)  Siblings:  Her sister was diagnosed with breast cancer around age 33. She had multiple surgeries, including a bilateral mastectomy. This was eventually metastatic to her bones and brain and she passed away at age 39 in 2012. Christine reports that she thought she might have had genetic testing, but now thinks that her sister was planning to have this and never had the test completed due to insurance issues.   Maternal:  Her mother is 79 years old with no known history of cancer.   Her uncle was diagnosed with throat cancer at an unknown age and passed away at age 59.   Her aunt was diagnosed with cervical cancer at an unknown age and passed away at age 65.  Another aunt was diagnosed with colon cancer at an unknown age and passed away at age 66.   Another uncle was diagnosed with skin cancer around age 86. He did have a significant history of sun exposure. He was then diagnosed with prostate cancer and colon cancer around the same time. He passed away at age 88.  She has five other aunts/uncles, all with no known history of cancer. One of these aunts is still alive, all others have passed away at older ages.   Her grandmother was diagnosed with breast cancer at an unknown age. This was metastatic throughout her body. She passed away at age 57.  Paternal:  Her father is 84 years old with no known history of cancer. He was adopted along with one of his biological sisters, but there is no other information known about his other biological relatives. His biological sister passed away at age 85 with no known history of cancer.     Christine is not aware of any genetic testing for inherited cancer risk in any of her family members.     Her maternal ethnicity (according to recent ancestry testing) is: Barbadian, Cam[place]onian, [place], [place], Northern , Central and Eastern . Her paternal ethnicity is: Barbadian, [place], Cameroonian, Gibraltarian. There is no known Ashkenazi Pentecostal ancestry on  either side of her family.     Discussion:  Christine's family history of cancer is suggestive of a hereditary cancer syndrome.  We reviewed the features of sporadic, familial, and hereditary cancers. In looking at Christine's family history, it is possible that a cancer susceptibility gene is present due to her sister's diagnosis of young breast cancer, as well as her maternal family history of multiple relatives with colon, breast, and other cancers.   We discussed the natural history and genetics of hereditary cancer. Based on her family history, we discussed genes in which mutations are associated with an increased risk for breast cancer, such as the BRCA1 and BRCA2 genes. Mutations in these genes cause a condition known as Hereditary Breast and Ovarian Cancer syndrome (HBOC). Women with a mutation in either of these genes are at increased risk for breast and ovarian cancer. There is also an increased risk for a second primary breast cancer. Men with a mutation in either of these genes are at increased risk for breast and prostate cancer. Both women and men may also be at increased risk for pancreatic cancer and melanoma.   A detailed handout regarding these genes and other genes in which mutations are associated with an increased risk for breast cancer will be provided to Christine via [employer] and can be found in the after visit summary. Topics included: inheritance pattern, cancer risks, cancer screening recommendations, and also risks, benefits and limitations of testing.   Based on her personal and family history, Christine meets current National Comprehensive Cancer Network (NCCN) criteria for genetic testing of high-penetrance breast cancer susceptibility genes, such as BRCA1, BRCA2, CDH1, PALB2, PTEN, STK11, and TP53.     We discussed that there are additional genes that could cause increased risk for breast, colon, prostate, and other cancers. As many of these genes present with overlapping features in a family  and accurate cancer risk cannot always be established based upon the pedigree analysis alone, it would be reasonable for Christine to consider panel genetic testing to analyze multiple genes at once.  We reviewed genetic testing options for Christine based on her personal and family history: a panel of genes associated with an increased risk for certain cancers, or larger panel options to include genes associated with increased risk for multiple different cancer types. She expressed an interest in more broad testing and opted for the Common Hereditary Cancers Panel.  Genetic testing is available for 48 genes associated with cancers of the breast, ovary, uterus, prostate and gastrointestinal system: [facility] Common Hereditary Cancers panel (APC, BIJAL, AXIN2, BAP1, BARD1, BMPR1A, BRCA1, BRCA2, BRIP1, CDH1, CDK4, CDKN2A, CHEK2, CTNNA1, DICER1, EPCAM, FH, GREM1, HOXB13, KIT, MBD4, MEN1, MLH1, MSH2, MSH3, MSH6, MUTYH, NF1, NTHL1, PALB2, PDGFRA, PMS2, POLD1, POLE, PTEN, RAD51C, RAD51D, SDHA, SDHB, SDHC, SDHD, SMAD4, SMARCA4, STK11, TP53, TSC1, TSC2, VHL).    We discussed that many of these genes are associated with specific hereditary cancer syndromes and published management guidelines: Hereditary Breast and Ovarian Cancer syndrome (BRCA1, BRCA2), Christian syndrome (MLH1, MSH2, MSH6, PMS2, EPCAM), Familial Adenomatous Polyposis (APC), Hereditary Diffuse Gastric Cancer (CDH1), Familial Atypical Multiple Mole Melanoma syndrome (CDK4, CDKN2A), Multiple Endocrine Neoplasia type 1 (MEN1), Juvenile Polyposis syndrome (BMPR1A, SMAD4), Cowden syndrome (PTEN), Li Fraumeni syndrome (TP53), Hereditary Paraganglioma and Pheochromocytoma syndrome (SDHA, SDHB, SDHC, SDHD), Peutz-Jeghers syndrome (STK11), MUTYH Associated Polyposis (MUTYH), Tuberous sclerosis complex (TSC1, TSC2), Von Hippel-Lindau disease (VHL), and Neurofibromatosis type 1 (NF1).   The BIJAL, AXIN2, BAP1, BRIP1, CHEK2, FH, GREM1, MBD4, MSH3, NTHL1, PALB2, POLD1, POLE, RAD51C,  and RAD51D genes are associated with increased cancer risk and have published management guidelines for certain cancers.   The remaining genes (BARD1, CTNNA1, DICER1, HOXB13, KIT, PDGFRA, and SMARCA4) are associated with increased cancer risk and may allow us to make medical recommendations when mutations are identified.   Due to COVID-19 precautions consent was obtained over the phone/video today. Genetic testing via the Common Hereditary Cancers Panel will be sent to [facility]. Christine opted to schedule a blood draw for testing. Turnaround time from date when sample is received at the lab: approximately 3-4 weeks.  Medical Management: For Christine, we reviewed that the information from genetic testing may determine:  additional cancer screening for which Christine may qualify (i.e. mammogram and breast MRI, more frequent colonoscopies, more frequent dermatologic exams, etc.),  options for risk reducing surgeries Christine could consider (i.e. bilateral mastectomy, surgery to remove her ovaries, etc.),    and targeted chemotherapies if she were to develop certain cancers in the future (i.e. immunotherapy for individuals with Christian syndrome, PARP inhibitors, etc.).   These recommendations will be discussed in detail once genetic testing is completed.     Plan:  1) Today Christine elected to proceed with genetic testing via the Common Hereditary Cancers Panel offered by [facility].  2) This information should be available in 3-4 weeks.  3) Christine will be scheduled for a virtual visit (phone or video) to discuss the results.    I spent 64 minutes on the date of the encounter doing chart review, virtual visit, documentation, and further activities as noted above.      Karlie Blank MS, [facility]  Licensed, Certified Genetic Counselor  Office: [phone]  Email: [email or web address]       Again, thank you for allowing me to participate in the care of your patient.        Sincerely,        Karlie MARK  JOSÉ Blank    Electronically signed

## 2025-04-02 NOTE — NURSING NOTE
Current patient location: [address] [address]    Is the patient currently in the state of [place]? YES    Visit mode: VIDEO    If the visit is dropped, the patient can be reconnected by:VIDEO VISIT: Send to e-mail at: [email or web address]    Will anyone else be joining the visit? NO  (If patient encounters technical issues they should call [phone]    Are changes needed to the allergy or medication list? N/A    Are refills needed on medications prescribed by this physician? NO    Rooming Documentation:  Not applicable    Reason for visit: Consult    KRISTAN MARTINEZ

## 2025-04-07 ENCOUNTER — LAB (OUTPATIENT)
Dept: LAB | Facility: CLINIC | Age: 60
End: 2025-04-07
Payer: COMMERCIAL

## 2025-04-07 DIAGNOSIS — Z80.3 FAMILY HISTORY OF MALIGNANT NEOPLASM OF BREAST: ICD-10-CM

## 2025-04-07 DIAGNOSIS — Z80.42 FAMILY HISTORY OF PROSTATE CANCER: ICD-10-CM

## 2025-04-07 DIAGNOSIS — Z80.0 FAMILY HISTORY OF COLON CANCER: ICD-10-CM

## 2025-04-07 PROCEDURE — 36415 COLL VENOUS BLD VENIPUNCTURE: CPT

## 2025-04-08 LAB
PERFORMING LABORATORY: NORMAL
SPECIMEN STATUS: NORMAL
TEST NAME: NORMAL

## 2025-04-14 LAB
SCANNED LAB RESULT: NORMAL
TEST NAME: NORMAL

## 2025-05-01 ENCOUNTER — VIRTUAL VISIT (OUTPATIENT)
Dept: ONCOLOGY | Facility: CLINIC | Age: 60
End: 2025-05-01
Attending: GENETIC COUNSELOR, MS
Payer: COMMERCIAL

## 2025-05-01 DIAGNOSIS — Z80.0 FAMILY HISTORY OF COLON CANCER: ICD-10-CM

## 2025-05-01 DIAGNOSIS — Z80.42 FAMILY HISTORY OF PROSTATE CANCER: ICD-10-CM

## 2025-05-01 DIAGNOSIS — Z80.3 FAMILY HISTORY OF MALIGNANT NEOPLASM OF BREAST: Primary | ICD-10-CM

## 2025-05-01 PROCEDURE — 999N000069 HC STATISTIC GENETIC COUNSELING, < 16 MIN: Mod: GT,95 | Performed by: GENETIC COUNSELOR, MS

## 2025-05-01 NOTE — LETTER
"5/1/2025      Christine Verdugo  [address]  [facility] [address]      Dear Colleague,    Thank you for referring your patient, Christine Verdugo, to the [facility]. Please see a copy of my visit note below.    Virtual Visit Details    Type of service:  Video Visit   Originating Location (pt. Location): Home  Distant Location (provider location):  Off-site  Platform used for Video Visit: Well    5/1/2025    Referring Provider: Inga Dumas DO     Presenting Information:  I spoke to Christine over video today to discuss her genetic testing results. Her blood was drawn on 4/7/25. The Common Hereditary Cancers Panel was ordered from [facility]. This testing was done because of Christine's family history of cancer.    Genetic Testing Result: NEGATIVE  Christine is negative for mutations in the 48 genes analyzed: APC, BIJAL, AXIN2, BAP1, BARD1, BMPR1A, BRCA1, BRCA2, BRIP1, CDH1, CDK4, CDKN2A, CHEK2, CTNNA1, DICER1, EPCAM, FH, GREM1, HOXB13, KIT, MBD4, MEN1, MLH1, MSH2, MSH3, MSH6, MUTYH, NF1, NTHL1, PALB2, PDGFRA, PMS2, POLD1, POLE, PTEN, RAD51C, RAD51D, SDHA, SDHB, SDHC, SDHD, SMAD4, SMARCA4, STK11, TP53, TSC1, TSC2, VHL.     A copy of the test report can be found in the Laboratory tab, dated 4/7/25, and named \"LABORATORY MISCELLANEOUS RESULT\". The report is scanned in as a linked document.    Interpretation:  We discussed several different interpretations of this negative test result.    One explanation may be that there is a different gene or combination of genes and environment that are associated with the cancers in this family.  It is possible that her relatives have a mutation in one of these genes and she did not inherit it.  There is also a small possibility that there is a mutation in one of these genes, and the testing laboratory could not find it with their current testing methods.       Screening:  Based on this negative test result, it is important for Christine and " her relatives to refer back to the family history for appropriate cancer screening.    Based on the personal and family history information she provided, Christine has an estimated 9.9% lifetime risk of developing breast cancer based on the CHRISTOPHER Risk Evaluation v8 model. Therefore, Christine does not meet current National Comprehensive Cancer Network (NCCN) guidelines for high risk breast screening, which is offered to women with a 20% lifetime risk or higher. However, it is still important for Christine to continue with routine breast screening under the care of her physicians. Christine is encouraged to discuss breast screening with her physicians.    Christine's close female relatives may remain at increased risk for breast cancer given their family history. Breast cancer screening is generally recommended to begin approximately 10 years younger than the earliest age of breast cancer diagnosis in the family, or at age 40, whichever comes first. Breast screening options should be discussed with an individual's primary care provider and a genetic counselor, to determine at what age to begin screening, what screening is appropriate, and if additional screening (such as breast MRI) is necessary based on personal/family history factors.    Christine has a family history of colon cancer in her maternal aunt and uncle. Per National Comprehensive Cancer Network (NCCN) guidelines, individuals with a second- and/or third-degree relative with colorectal cancer diagnosed at any age should start colonoscopy at age 45, and should be repeated every 10 years, or per colonoscopy findings. Per the American Cancer Society, colon screening in the average population should begin at age 45. These recommendations should be discussed with Christine's physicians, who should make final screening recommendations.    Other population cancer screening options, such as those recommended by the American Cancer Society and the National Comprehensive Cancer  Network (NCCN), are also appropriate for Christine and her family. These screening recommendations may change if there are changes to Christine's personal and/or family history of cancer. Final screening recommendations should be made in consultation with each individual's primary care provider.       Inheritance:  We reviewed the autosomal dominant inheritance of mutations in these genes. We discussed that Christine cannot/did not pass on an identifiable mutation in these genes to her children based on this test result. Mutations in these genes do not skip generations.      Additional Testing Considerations:  Although Christine's genetic testing result was negative, other relatives may still carry a gene mutation associated with an increased risk for cancer. Genetic counseling is recommended for her sister, her nephews (sons of her sister who had breast cancer), and her maternal relatives to discuss genetic testing options. If any of her relatives do pursue genetic testing, Christine is encouraged to contact me so that we may review the impact of their test results on her.    Summary:  We do not have an explanation for Christine's family history of cancer. While no genetic changes were identified, Christine may still be at risk for certain cancers due to family history, environmental factors, or other genetic causes not identified by this test. Because of that, it is important that she continue with cancer screening based on her personal and family history as discussed above.    Genetic testing is rapidly advancing, and new cancer susceptibility genes will most likely be identified in the future. Therefore, I encouraged Christine to contact me regularly or if there are changes in her personal or family history. This may change how we assess her cancer risk, screening, and the testing we would offer.    Plan:  1. She has access to a copy of her results via [employer].   2. She plans to follow-up with her physicians.  3. She should  contact me regularly, or sooner if her family history changes.    If Christine has any further questions, I encouraged her to contact me at [phone].    I spent 12 minutes on the date of the encounter doing chart review, virtual visit, documentation, and further activities as noted above.      Karlie Blank MS, [facility]  Licensed, Certified Genetic Counselor  Office: [phone]  Email: [email or web address]      Again, thank you for allowing me to participate in the care of your patient.        Sincerely,        Karlie Blank GC    Electronically signed

## 2025-05-01 NOTE — NURSING NOTE
Is the patient currently in the state of [place]? YES    Current patient location: [address] [address]    Visit mode:Video    If the visit is dropped, the patient can be reconnected by: VIDEO VISIT: Text to cell phone:   Telephone Information:   Mobile [phone]       Will anyone else be joining the visit? No  (If patient encounters technical issues they should call [phone])    Are changes needed to the allergy or medication list? N/A    Are refills needed on medications prescribed by this physician? No    Rooming Documentation: Questionnaire(s) not done per department protocol.    Reason for visit: RECHECK     Kelly Durham, VVF

## 2025-05-01 NOTE — PROGRESS NOTES
"Virtual Visit Details    Type of service:  Video Visit   Video Start Time: {video visit start/end time for provider to select:495368}  Video End Time:{video visit start/end time for provider to select:430176}    Originating Location (pt. Location): {video visit patient location:289611::\"Home\"}  {PROVIDER LOCATION On-site should be selected for visits conducted from your clinic location or adjoining [facility] hospital, academic office, or other nearby [facility] building. Off-site should be selected for all other provider locations, including home:274402}  Distant Location (provider location):  {virtual location provider:832443}  Platform used for Video Visit: {Virtual Visit Platforms:651667::\"[employer]\"}        " Evaluation v8 model. Therefore, Christine does not meet current National Comprehensive Cancer Network (NCCN) guidelines for high risk breast screening, which is offered to women with a 20% lifetime risk or higher. However, it is still important for Christine to continue with routine breast screening under the care of her physicians. Christine is encouraged to discuss breast screening with her physicians.    Christine's close female relatives may remain at increased risk for breast cancer given their family history. Breast cancer screening is generally recommended to begin approximately 10 years younger than the earliest age of breast cancer diagnosis in the family, or at age 40, whichever comes first. Breast screening options should be discussed with an individual's primary care provider and a genetic counselor, to determine at what age to begin screening, what screening is appropriate, and if additional screening (such as breast MRI) is necessary based on personal/family history factors.    Christine has a family history of colon cancer in her maternal aunt and uncle. Per National Comprehensive Cancer Network (NCCN) guidelines, individuals with a second- and/or third-degree relative with colorectal cancer diagnosed at any age should start colonoscopy at age 45, and should be repeated every 10 years, or per colonoscopy findings. Per the American Cancer Society, colon screening in the average population should begin at age 45. These recommendations should be discussed with Christine's physicians, who should make final screening recommendations.    Other population cancer screening options, such as those recommended by the American Cancer Society and the National Comprehensive Cancer Network (NCCN), are also appropriate for Christine and her family. These screening recommendations may change if there are changes to Christine's personal and/or family history of cancer. Final screening recommendations should be made in consultation  with each individual's primary care provider.       Inheritance:  We reviewed the autosomal dominant inheritance of mutations in these genes. We discussed that Christine cannot/did not pass on an identifiable mutation in these genes to her children based on this test result. Mutations in these genes do not skip generations.      Additional Testing Considerations:  Although Christine's genetic testing result was negative, other relatives may still carry a gene mutation associated with an increased risk for cancer. Genetic counseling is recommended for her sister, her nephews (sons of her sister who had breast cancer), and her maternal relatives to discuss genetic testing options. If any of her relatives do pursue genetic testing, Christine is encouraged to contact me so that we may review the impact of their test results on her.    Summary:  We do not have an explanation for Christine's family history of cancer. While no genetic changes were identified, Christine may still be at risk for certain cancers due to family history, environmental factors, or other genetic causes not identified by this test. Because of that, it is important that she continue with cancer screening based on her personal and family history as discussed above.    Genetic testing is rapidly advancing, and new cancer susceptibility genes will most likely be identified in the future. Therefore, I encouraged Christine to contact me regularly or if there are changes in her personal or family history. This may change how we assess her cancer risk, screening, and the testing we would offer.    Plan:  1. She has access to a copy of her results via [employer].   2. She plans to follow-up with her physicians.  3. She should contact me regularly, or sooner if her family history changes.    If Christine has any further questions, I encouraged her to contact me at [phone].    I spent 12 minutes on the date of the encounter doing chart review, virtual visit,  documentation, and further activities as noted above.      Karlie Blank MS, [facility] [place]  Licensed, Certified Genetic Counselor  Office: [phone]  Email: [email or web address]